# Patient Record
Sex: FEMALE | Race: WHITE | NOT HISPANIC OR LATINO | Employment: FULL TIME | URBAN - METROPOLITAN AREA
[De-identification: names, ages, dates, MRNs, and addresses within clinical notes are randomized per-mention and may not be internally consistent; named-entity substitution may affect disease eponyms.]

---

## 2021-03-10 ENCOUNTER — IMMUNIZATION (OUTPATIENT)
Dept: OBSTETRICS AND GYNECOLOGY | Facility: CLINIC | Age: 46
End: 2021-03-10

## 2021-03-10 DIAGNOSIS — Z23 NEED FOR VACCINATION: Primary | ICD-10-CM

## 2021-03-10 PROCEDURE — 0001A COVID-19, MRNA, LNP-S, PF, 30 MCG/0.3 ML DOSE VACCINE: ICD-10-PCS | Mod: CV19,,, | Performed by: FAMILY MEDICINE

## 2021-03-10 PROCEDURE — 0001A COVID-19, MRNA, LNP-S, PF, 30 MCG/0.3 ML DOSE VACCINE: CPT | Mod: CV19,,, | Performed by: FAMILY MEDICINE

## 2021-03-10 PROCEDURE — 91300 COVID-19, MRNA, LNP-S, PF, 30 MCG/0.3 ML DOSE VACCINE: CPT | Mod: ,,, | Performed by: FAMILY MEDICINE

## 2021-03-10 PROCEDURE — 91300 COVID-19, MRNA, LNP-S, PF, 30 MCG/0.3 ML DOSE VACCINE: ICD-10-PCS | Mod: ,,, | Performed by: FAMILY MEDICINE

## 2021-03-31 ENCOUNTER — IMMUNIZATION (OUTPATIENT)
Dept: OBSTETRICS AND GYNECOLOGY | Facility: CLINIC | Age: 46
End: 2021-03-31
Payer: COMMERCIAL

## 2021-03-31 DIAGNOSIS — Z23 NEED FOR VACCINATION: Primary | ICD-10-CM

## 2021-03-31 PROCEDURE — 0002A COVID-19, MRNA, LNP-S, PF, 30 MCG/0.3 ML DOSE VACCINE: CPT | Mod: PBBFAC | Performed by: FAMILY MEDICINE

## 2021-03-31 PROCEDURE — 91300 COVID-19, MRNA, LNP-S, PF, 30 MCG/0.3 ML DOSE VACCINE: CPT | Mod: PBBFAC | Performed by: FAMILY MEDICINE

## 2022-01-03 ENCOUNTER — OFFICE VISIT (OUTPATIENT)
Dept: OBSTETRICS AND GYNECOLOGY | Facility: CLINIC | Age: 47
End: 2022-01-03
Attending: STUDENT IN AN ORGANIZED HEALTH CARE EDUCATION/TRAINING PROGRAM
Payer: COMMERCIAL

## 2022-01-03 VITALS
DIASTOLIC BLOOD PRESSURE: 82 MMHG | HEIGHT: 61 IN | BODY MASS INDEX: 23.47 KG/M2 | SYSTOLIC BLOOD PRESSURE: 116 MMHG | WEIGHT: 124.31 LBS

## 2022-01-03 DIAGNOSIS — Z12.31 VISIT FOR SCREENING MAMMOGRAM: Primary | ICD-10-CM

## 2022-01-03 DIAGNOSIS — N64.4 BREAST PAIN: ICD-10-CM

## 2022-01-03 DIAGNOSIS — Z12.4 SCREENING FOR CERVICAL CANCER: ICD-10-CM

## 2022-01-03 DIAGNOSIS — Z01.419 WELL WOMAN EXAM: ICD-10-CM

## 2022-01-03 DIAGNOSIS — Z11.51 SCREENING FOR HPV (HUMAN PAPILLOMAVIRUS): ICD-10-CM

## 2022-01-03 PROCEDURE — 3074F SYST BP LT 130 MM HG: CPT | Mod: CPTII,S$GLB,, | Performed by: STUDENT IN AN ORGANIZED HEALTH CARE EDUCATION/TRAINING PROGRAM

## 2022-01-03 PROCEDURE — 87624 HPV HI-RISK TYP POOLED RSLT: CPT | Performed by: STUDENT IN AN ORGANIZED HEALTH CARE EDUCATION/TRAINING PROGRAM

## 2022-01-03 PROCEDURE — 99386 PREV VISIT NEW AGE 40-64: CPT | Mod: S$GLB,,, | Performed by: STUDENT IN AN ORGANIZED HEALTH CARE EDUCATION/TRAINING PROGRAM

## 2022-01-03 PROCEDURE — 1159F MED LIST DOCD IN RCRD: CPT | Mod: CPTII,S$GLB,, | Performed by: STUDENT IN AN ORGANIZED HEALTH CARE EDUCATION/TRAINING PROGRAM

## 2022-01-03 PROCEDURE — 3008F PR BODY MASS INDEX (BMI) DOCUMENTED: ICD-10-PCS | Mod: CPTII,S$GLB,, | Performed by: STUDENT IN AN ORGANIZED HEALTH CARE EDUCATION/TRAINING PROGRAM

## 2022-01-03 PROCEDURE — 99999 PR PBB SHADOW E&M-EST. PATIENT-LVL III: ICD-10-PCS | Mod: PBBFAC,,, | Performed by: STUDENT IN AN ORGANIZED HEALTH CARE EDUCATION/TRAINING PROGRAM

## 2022-01-03 PROCEDURE — 99999 PR PBB SHADOW E&M-EST. PATIENT-LVL III: CPT | Mod: PBBFAC,,, | Performed by: STUDENT IN AN ORGANIZED HEALTH CARE EDUCATION/TRAINING PROGRAM

## 2022-01-03 PROCEDURE — 99386 PR PREVENTIVE VISIT,NEW,40-64: ICD-10-PCS | Mod: S$GLB,,, | Performed by: STUDENT IN AN ORGANIZED HEALTH CARE EDUCATION/TRAINING PROGRAM

## 2022-01-03 PROCEDURE — 1159F PR MEDICATION LIST DOCUMENTED IN MEDICAL RECORD: ICD-10-PCS | Mod: CPTII,S$GLB,, | Performed by: STUDENT IN AN ORGANIZED HEALTH CARE EDUCATION/TRAINING PROGRAM

## 2022-01-03 PROCEDURE — 3008F BODY MASS INDEX DOCD: CPT | Mod: CPTII,S$GLB,, | Performed by: STUDENT IN AN ORGANIZED HEALTH CARE EDUCATION/TRAINING PROGRAM

## 2022-01-03 PROCEDURE — 3079F DIAST BP 80-89 MM HG: CPT | Mod: CPTII,S$GLB,, | Performed by: STUDENT IN AN ORGANIZED HEALTH CARE EDUCATION/TRAINING PROGRAM

## 2022-01-03 PROCEDURE — 88175 CYTOPATH C/V AUTO FLUID REDO: CPT | Performed by: STUDENT IN AN ORGANIZED HEALTH CARE EDUCATION/TRAINING PROGRAM

## 2022-01-03 PROCEDURE — 3074F PR MOST RECENT SYSTOLIC BLOOD PRESSURE < 130 MM HG: ICD-10-PCS | Mod: CPTII,S$GLB,, | Performed by: STUDENT IN AN ORGANIZED HEALTH CARE EDUCATION/TRAINING PROGRAM

## 2022-01-03 PROCEDURE — 3079F PR MOST RECENT DIASTOLIC BLOOD PRESSURE 80-89 MM HG: ICD-10-PCS | Mod: CPTII,S$GLB,, | Performed by: STUDENT IN AN ORGANIZED HEALTH CARE EDUCATION/TRAINING PROGRAM

## 2022-01-04 NOTE — PROGRESS NOTES
Chief Complaint: Well Woman Exam     HPI:      Nguyen Dimas is a 46 y.o.  who presents today for well woman exam.  LMP: No LMP recorded (lmp unknown). Patient has had a hysterectomy.  Supracervical hysterectomy.  Also has a history of small complex ovarian cyst in  that resolved.  Does report some breast pain.  No lumps or masses.  No issues, problems, or complaints. Specifically, patient denies abnormal vaginal bleeding, discharge, pelvic pain, urinary problems, or changes in appetite. Ms. Dimas is currently sexually active with a single male partner.     Previous Pap: Needs  Previous Mammogram: Needs  Most Recent Dexa: NA  Colonoscopy: NA    GYN Hx:  Menarche 16.  S/p hysterectomy in  with no cycles since then.  Possible abnormal pap smear years ago.     OB History        3    Para   3    Term   3            AB        Living   4       SAB        IAB        Ectopic        Multiple   1    Live Births   4               Past Medical History:   Diagnosis Date    Abnormal Pap smear of cervix      Past Surgical History:   Procedure Laterality Date    ABDOMINOPLASTY       SECTION      HYSTERECTOMY      INSERTION OF BREAST IMPLANT  2007    WISDOM TOOTH EXTRACTION       Social History     Socioeconomic History    Marital status:    Tobacco Use    Smoking status: Never Smoker    Smokeless tobacco: Never Used   Substance and Sexual Activity    Alcohol use: Yes     Comment: rarely    Drug use: Never    Sexual activity: Yes     Partners: Male     Birth control/protection: See Surgical Hx     Family History   Problem Relation Age of Onset    Breast cancer Neg Hx     Colon cancer Neg Hx     Ovarian cancer Neg Hx      No current outpatient medications on file.    ROS:     GENERAL: Denies unintentional weight gain or weight loss. Feeling well overall.   SKIN: Denies rash or lesions.   HEENT: Denies headaches, or vision changes.   CARDIOVASCULAR: Denies  "palpitations or chest pain.   RESPIRATORY: Denies shortness of breath or dyspnea on exertion.  BREASTS: Denies pain, lumps, or nipple discharge.   ABDOMEN: Denies abdominal pain, constipation, diarrhea, nausea, vomiting, change in appetite.  URINARY: Denies frequency, dysuria, hematuria.  NEUROLOGIC: Denies syncope or weakness.   PSYCHIATRIC: Denies depression, anxiety or mood swings.    Physical Exam:      PHYSICAL EXAM:  /82   Ht 5' 1" (1.549 m)   Wt 56.4 kg (124 lb 5.4 oz)   LMP  (LMP Unknown)   BMI 23.49 kg/m²   Body mass index is 23.49 kg/m².     APPEARANCE: Well nourished, well developed, in no acute distress.  PSYCH: Appropriate mood and affect.  SKIN: No acne or hirsutism.  NECK: Neck symmetric without masses or thyromegaly.  NODES: No inguinal, axillary, or supraclavicular lymph node enlargement.  BREASTS: Symmetrical, no skin changes or visible lesions.  No palpable masses or nipple discharge bilaterally.  ABDOMEN: Soft.  No tenderness or masses.    PELVIC: Normal external genitalia without lesions.  Normal hair distribution.  Adequate perineal body, normal urethral meatus.  Vagina moist and well rugated without lesions or discharge.  Cervix pink, without lesions, discharge or tenderness.  No significant cystocele or rectocele.  Bimanual exam shows uterus to be surgically absent.  Adnexa without masses or tenderness.    EXTREMITIES: No edema.  No tenderness to palpation.    Assessment/Plan:     46 y.o.     Visit for screening mammogram  -     Cancel: Mammo Digital Screening Bilat w/ Rick; Future; Expected date: 2022    Breast pain  -     Mammo Digital Diagnostic Bilat with Rick; Future; Expected date: 2022  -     US Breast Left Complete; Future; Expected date: 2022    Screening for HPV (human papillomavirus)  -     HPV High Risk Genotypes, PCR    Screening for cervical cancer  -     Liquid-Based Pap Smear, Screening    Well woman exam          Counselin.  Annual " exam performed today without difficulty.  Patient was counseled today on current ASCCP pap guidelines, the recommendation for yearly pelvic exams, healthy diet and exercise routines, annual mammograms.  DDx mammogram for breast pain.  Pap smear collected.  All questions answered.    2.  Follow up with PCP for other health maintenance.  3.  Follow up in about 3 months (around 4/3/2022).      Use of the [employer] Patient Portal discussed and encouraged during today's visit.

## 2022-01-07 LAB
FINAL PATHOLOGIC DIAGNOSIS: NORMAL
Lab: NORMAL

## 2022-01-14 LAB
HPV HR 12 DNA SPEC QL NAA+PROBE: NEGATIVE
HPV16 AG SPEC QL: NEGATIVE
HPV18 DNA SPEC QL NAA+PROBE: NEGATIVE

## 2022-01-18 ENCOUNTER — PATIENT MESSAGE (OUTPATIENT)
Dept: OBSTETRICS AND GYNECOLOGY | Facility: CLINIC | Age: 47
End: 2022-01-18
Payer: COMMERCIAL

## 2022-01-25 ENCOUNTER — HOSPITAL ENCOUNTER (OUTPATIENT)
Dept: RADIOLOGY | Facility: HOSPITAL | Age: 47
Discharge: HOME | End: 2022-01-25
Attending: STUDENT IN AN ORGANIZED HEALTH CARE EDUCATION/TRAINING PROGRAM
Payer: COMMERCIAL

## 2022-01-25 DIAGNOSIS — N64.4 BREAST PAIN: ICD-10-CM

## 2022-01-25 PROCEDURE — 76642 ULTRASOUND BREAST LIMITED: CPT | Mod: TC,LT

## 2022-01-25 PROCEDURE — 77062 MAMMO DIGITAL DIAGNOSTIC BILAT WITH TOMO: ICD-10-PCS | Mod: 26,,, | Performed by: RADIOLOGY

## 2022-01-25 PROCEDURE — 77066 DX MAMMO INCL CAD BI: CPT | Mod: 26,,, | Performed by: RADIOLOGY

## 2022-01-25 PROCEDURE — 77066 DX MAMMO INCL CAD BI: CPT | Mod: TC

## 2022-01-25 PROCEDURE — 77062 BREAST TOMOSYNTHESIS BI: CPT | Mod: 26,,, | Performed by: RADIOLOGY

## 2022-01-25 PROCEDURE — 76642 ULTRASOUND BREAST LIMITED: CPT | Mod: 26,LT,, | Performed by: RADIOLOGY

## 2022-01-25 PROCEDURE — 76642 US BREAST LEFT LIMITED: ICD-10-PCS | Mod: 26,LT,, | Performed by: RADIOLOGY

## 2022-01-25 PROCEDURE — 77066 MAMMO DIGITAL DIAGNOSTIC BILAT WITH TOMO: ICD-10-PCS | Mod: 26,,, | Performed by: RADIOLOGY

## 2022-01-26 ENCOUNTER — TELEPHONE (OUTPATIENT)
Dept: RADIOLOGY | Facility: HOSPITAL | Age: 47
End: 2022-01-26
Payer: COMMERCIAL

## 2022-01-26 NOTE — TELEPHONE ENCOUNTER
Spoke with patient. Reviewed breast biopsy procedure and reviewed instructions for breast biopsy. Patient expressed understanding and all questions were answered. Provided patient with my phone number to call for any further concerns or questions.   Patient scheduled breast biopsy at the [facility] on 2/8/2022.

## 2022-01-26 NOTE — TELEPHONE ENCOUNTER
Spoke with patient.  Breast biopsy cancelled after review of prior imagine.  All questions answered.  She will let us know if she has any questions or concerns.

## 2022-02-11 ENCOUNTER — PATIENT MESSAGE (OUTPATIENT)
Dept: OBSTETRICS AND GYNECOLOGY | Facility: CLINIC | Age: 47
End: 2022-02-11
Payer: COMMERCIAL

## 2022-02-11 DIAGNOSIS — R10.2 PELVIC PAIN: Primary | ICD-10-CM

## 2022-02-13 ENCOUNTER — PATIENT MESSAGE (OUTPATIENT)
Dept: OBSTETRICS AND GYNECOLOGY | Facility: CLINIC | Age: 47
End: 2022-02-13
Payer: COMMERCIAL

## 2022-03-17 ENCOUNTER — CLINICAL SUPPORT (OUTPATIENT)
Dept: REHABILITATION | Facility: HOSPITAL | Age: 47
End: 2022-03-17
Attending: STUDENT IN AN ORGANIZED HEALTH CARE EDUCATION/TRAINING PROGRAM
Payer: COMMERCIAL

## 2022-03-17 DIAGNOSIS — M62.838 MUSCLE SPASM: ICD-10-CM

## 2022-03-17 DIAGNOSIS — R10.30 LOWER ABDOMINAL PAIN: ICD-10-CM

## 2022-03-17 DIAGNOSIS — M79.10 MYALGIA: Primary | ICD-10-CM

## 2022-03-17 DIAGNOSIS — M54.6 THORACIC SPINE PAIN: ICD-10-CM

## 2022-03-17 DIAGNOSIS — R10.2 PELVIC PAIN: ICD-10-CM

## 2022-03-17 PROCEDURE — 97530 THERAPEUTIC ACTIVITIES: CPT | Mod: PO

## 2022-03-17 PROCEDURE — 97112 NEUROMUSCULAR REEDUCATION: CPT | Mod: PO

## 2022-03-17 PROCEDURE — 97161 PT EVAL LOW COMPLEX 20 MIN: CPT | Mod: PO

## 2022-03-17 NOTE — PLAN OF CARE
[facility]   Pelvic Health Physical Therapy Initial Evaluation      Date: 3/17/2022   Name: Nguyen Dimas  Clinic Number: [id number]    Therapy Diagnosis:   Encounter Diagnoses   Name Primary?    Pelvic pain     Myalgia Yes    Muscle spasm     Lower abdominal pain     Thoracic spine pain      Physician: Sasha Wood, *    Physician Orders: PT Eval and Treat - R10.2  Evaluation Date: 3/17/2022    Authorization Period Expiration: 2023  Plan of Care Expiration: 2022  Progress Note Due: 2022  Visit # / Visits authorized:    FOTO: Issued Visit #: -/-    Precautions: Standard    Time In: 1635  Time Out: 1735  Total Appointment Time (timed & untimed codes): 60 minutes    SUBJECTIVE     Date of onset: chronic    History of current condition - Nguyen reports: Having a recent flare following a coughing bout where back is back to feeling tension and locking up of back with some activity.  - feels better able to engage or get into a good position with workouts, but not throughout day- feels like positoning and everything is really uncomfortable   - abdominal wall feels better  - endo is mainly with back tenderness and slight left ovarian during month  - cough feels like pressure in neck and not so much in chest   - aggravated with fwd bending and lifting left leg (ex. toweling off after shower or drying hair), and with sneeze/cough and especially to left side; sitting >60'  - once back begins to relac and left is better the right is still bothersome  Nguyen goals: manage episodes of back pain independently     Surgical History: Nguyen Dimas  has a past surgical history that includes  section; Hysterectomy (); Abdominoplasty (); Insertion of breast implant (); Winside tooth extraction; and Augmentation of breast (Bilateral, ).    Red Flags: Pt denies and new or previous changes to bowel/bladder, saddle anesthesia, unexplained weight loss, and  unexplained changes to balance or gait.    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 I - Independnent  Current Level of Function:   Changing & Maintaining Body Position: Maintaining a Body Position: Remaining Seated, Remaining  Standing, Squatting  Mobility: Walking & Moving Around: prolonged periods  Carrying, Moving & Handling Objects: Community Integration/Access; Work/Vocation/Occupation  Medical History: Nguyen  has a past medical history of Abnormal Pap smear of cervix.   Imaging: see chart  Medications: Nguyen currently has no medications in their medication list.    Allergies: Review of patient's allergies indicates:  No Known Allergies     OBJECTIVE   See EMR under MEDIA for written consent provided 3/17/2022  Chaperone: declined    FUNCTIONAL MOVEMENT  SLS: ppt and knee flex  Squat: thoracic spine ext with OH  Bridges: hamstring cramping right>left with hip ir  Thoracic ROM: ext limited<limited; with arms oh and pvc difficulty with inhale to post. ribs; improved cough after cueing with pt reporting it felt better and more productive  Lumbar ROM: ext limited   Hip ROM: hip flexion tolerable with er, painful with ir/discomfort  General Postural Observation: self corrects posture with ppt; rib flare in rest; cueing for rib and pelvic alignment     OBSERVATION/PALPATION  Abdominal wall/Scarring: abdominal wall with increased fascial mobility, umbilicus with  less discomfort with palpation and less restrictions in mobility noted throughout abdomen; numbness remains to suprapubic region of scar   Rib cage/Breath: Decreased excursion bilaterally of lateral ribs , Decreased excursion of the sternum  and difficulty with post rib enggement in all positions and reproruction of cramp on l lat with thoracic flexion and inhale;      TREATMENT   Total Treatment time (time-based codes) separate from Evaluation: 38 minutes     2 units of TherAct: 25 minutes  improve pfm during functional mobility and adls, to improve body mechanics during toileting, and to simulate physical requirement of exercise, work, and parenting    [x]   psoas, coccygeus release   []   iap mgmt + body mechanics with adls; bowel +bladder habits/mechanics   []   nutrition and digestion on pfm and bowel function     1 units of Neuro Re-ed.: 15 minutes  sensory re-education to increase optimal response of tissues to sensory stimuli encountered in daily task completion, improve quality of muscle recruitment, improve motor control and sequencing, and increase coordination   [x]   Coordination, Control, Posture, and Proprioception    [x]   rib breath +v/t cueing and facilitation for rib mobiltiy    [x]   postural awareness   []   pfm awareness   [x]   core engagement +facilitation and v/t cueing to maintain rib alignment   [x]   fascial release to thoracic spine in OH supine squat positioning with pvc  and cueing/facilitation     - units of TherEx: - minutes strength, endurance, and ROM    []      []   bridges   []   squat     Neuromuscular Re-education to improve Coordination, Posture and Proprioception for 15 minutes including: umbilical and abdominal wall fascial mob; at rest and with functional mobility for sensory re-education to increase optimal response of tissues to sensory stimuli encountered in daily task completion and increase coordination    Therapeutic Activity to improve dynamic functional performance  for 25 minutes including:  body mechanics and iap mgmt; anatomy and physiology of pfm and function with toileting and mobility; psoas + coccygeus release bilaterally; bowel and bladder mechanics to improve pfm during functional mobility and adls    PATIENT EDUCATION AND HOME EXERCISES     Education provided:   general anatomy/physiology of urinary/ bowel  system, benefits of treatment and risks of treatment were discussed with the patient. Additionally, anatomy/physiology of pelvic floor, posture/body mechanices, behavior modifications and abdominal and thoracic spine interaction were reviewed.     Written Home Exercises provided: yes.  Exercises were reviewed and Nguyen was able to demonstrate them prior to the end of the session. Nguyen demonstrated good  understanding of the education provided. See EMR under Patient Instructions for exercises provided during therapy sessions.    ASSESSMENT     Nguyen is a 46 y.o. female referred to outpatient Physical Therapy with a medical diagnosis of pelvic pain, as well as complaints of low to mid back and abdominal pain limiting ability to participate in exercise/health/wellness and work activities independently and without reproduction of sx. Pt presents with decreased range of motion, coordination, and proprioception of bilateral lower extremities and b hips; decreased arom of thoracic and lumbar  spines and ribs contributing to decreased motor control and coordination of core stability and iap mgmt, contributing to persistent and recurrent nature of sx.  Patient prognosis is Fair.   Patient will benefit from skilled outpatient Physical Therapy to address the deficits stated above and in the chart below, provide patient/family education, and to maximize patient's level of independence.     Plan of care discussed with patient: Yes  Patient's spiritual, cultural and educational needs considered and patient is agreeable to the plan of care and goals as stated below:      Anticipated Barriers for therapy: none    Medical Necessity is demonstrated by the following:  History  Co-morbidities and personal factors that may impact the plan of care Co-morbidities   endometriosis, prior abdominal surgery and prior pelvic surgery    Personal Factors  no deficits     low   Examination  Body structures and functions, activity limitations and participation restrictions that may impact the plan of care Body Regions/Systems/Functions:  altered posture, pelvic asymmetry, poor knowledge of body mechanics and posture, adhered abdominal scar, poor trunk stability and unable to co-contract or co-relax abdominal wall and pelvic floor muscles     Participation Restrictions:  social activities with friends/family, ADL participation affected by pain, work duties and exercise restrictions due to pain     Activity limitations:   Learning and applying knowledge  no deficits    General Tasks and Commands  no deficits    Communication  no deficits    Mobility  lifting and carrying objects    Self care  no deficits    Domestic Life  no deficits    Interactions/Relationships  no deficits    Life Areas  no deficits    Community and Social Life  no deficits       low   Clinical Presentation stable and uncomplicated low   Decision Making/ Complexity Score: low     Goals:  Short Term Goals: 6 weeks   Pt will be able to report 50% increase in productivity of coughing.  Pt to perform co-contraction of pelvic floor muscles and core with lifting and during weighted exercise.  Pt to demonstrate proper diaphragmatic breathing to help with calming the nervous system in order to decrease pain and to improve abdominal wall musculature extensibility.   Pt to report being able to complete a half day at work without significant increase in pain to demonstrate a return towards prior level of function.  Pt to report no pain with palpation of abdominal wall due to improvement in soft tissue mobility from moderate to minimal  restrictions.   Pt to be able to shower and dress without reproduction of sx at least 50% of the time.    Long Term Goals: 16 weeks   Pt will be able to sit at least 75' or during a movie without reproduction of sx.  Pt will be able to report 50% increase in productivity of coughing.  Pt to report being able to complete a full day at work without significant increase in pain to demonstrate a return towards prior level of function.  Pt to report no pain with palpation of abdominal wall due to improvement in soft tissue mobility from moderate to minimal restrictions.   Pt to report feeling confident in ability to assume appropriate mechanics during work 80% of the time.  Pt to be able to shower and dress without reproduction of sx at least 85% of time.    PLAN     Plan of care Certification: 3/17/2022 to 7/7/2022.    Outpatient Physical Therapy 1 time(s) every 2-4 week(s) for 16 weeks to include the following interventions: Gait Training, Manual Therapy, Neuromuscular Re-ed, Patient Education, Therapeutic Activities, Therapeutic Exercise, and dry needling.     Next Visit: recheck rib mobility and fwd bend +hip flexion    Gissell Garza, PT      I CERTIFY THE NEED FOR THESE SERVICES FURNISHED UNDER THIS PLAN OF TREATMENT AND WHILE UNDER MY CARE   Physician's comments:     Physician's Signature: ___________________________________________________

## 2022-04-15 PROBLEM — M54.6 THORACIC SPINE PAIN: Status: ACTIVE | Noted: 2022-04-15

## 2022-04-15 PROBLEM — R10.2 PELVIC PAIN: Status: ACTIVE | Noted: 2022-04-15

## 2022-04-15 PROBLEM — M79.10 MYALGIA: Status: ACTIVE | Noted: 2022-04-15

## 2022-04-15 PROBLEM — R10.30 LOWER ABDOMINAL PAIN: Status: ACTIVE | Noted: 2022-04-15

## 2022-04-15 PROBLEM — M62.838 MUSCLE SPASM: Status: ACTIVE | Noted: 2022-04-15

## 2022-04-25 ENCOUNTER — PATIENT MESSAGE (OUTPATIENT)
Dept: REHABILITATION | Facility: HOSPITAL | Age: 47
End: 2022-04-25
Payer: COMMERCIAL

## 2022-05-05 ENCOUNTER — CLINICAL SUPPORT (OUTPATIENT)
Dept: REHABILITATION | Facility: HOSPITAL | Age: 47
End: 2022-05-05
Payer: COMMERCIAL

## 2022-05-05 DIAGNOSIS — M54.6 THORACIC SPINE PAIN: ICD-10-CM

## 2022-05-05 DIAGNOSIS — R10.30 LOWER ABDOMINAL PAIN: ICD-10-CM

## 2022-05-05 DIAGNOSIS — M79.10 MYALGIA: ICD-10-CM

## 2022-05-05 DIAGNOSIS — R10.2 PELVIC AND PERINEAL PAIN: Primary | ICD-10-CM

## 2022-05-05 PROCEDURE — 97112 NEUROMUSCULAR REEDUCATION: CPT | Mod: PO

## 2022-05-05 PROCEDURE — 97530 THERAPEUTIC ACTIVITIES: CPT | Mod: PO

## 2022-05-05 NOTE — PROGRESS NOTES
Pelvic Health Physical Therapy   Progress Note     Name: Nguyen Dimas; [id number]  Visit Date: 5/5/2022  Visit 2 of 24 Auth. Expiration: 12/31/2022 POC Exp.: 7/7/2022   Eval Date: 3/17/2022 Next PN: 6/2/2022 1 Cancels    FOTO: Issued Visit #: -/- Last PN: - - No Shows    Physician: Sasha Wood, *  Physician Orders: PT Eval and Treat - R10.2  Therapy Diagnosis:   Encounter Diagnoses   Name Primary?    Pelvic and perineal pain Yes    Myalgia     Lower abdominal pain     Thoracic spine pain      Precautions: Standard    Time In/Out: 1640/1740  Total Billable Time: 60 minutes    Subjective     Goals:  Short Term Goals: 6 weeks   met   Pt will be able to report 50% increase in productivity of coughing.  met   Pt to perform co-contraction of pelvic floor muscles and core with lifting and during weighted exercise.  met   Pt to demonstrate proper diaphragmatic breathing to help with calming the nervous system in order to decrease pain and to improve abdominal wall musculature extensibility.   met   Pt to report being able to complete a half day at work without significant increase in pain to demonstrate a return towards prior level of function.  met   Pt to report no pain with palpation of abdominal wall due to improvement in soft tissue mobility from moderate to minimal restrictions.   met   Pt to be able to shower and dress without reproduction of sx at least 50% of the time.     Long Term Goals: 16 weeks     Pt will be able to sit at least 75' or during a movie without reproduction of sx.    Pt will be able to report 50% increase in productivity of coughing.    Pt to report being able to complete a full day at work without significant increase in pain to demonstrate a return towards prior level of function.    Pt to report no pain with palpation of abdominal wall due to improvement in soft tissue mobility from moderate to minimal restrictions.     Pt to report feeling confident in ability to  assume appropriate mechanics during work 80% of the time.    Pt to be able to shower and dress without reproduction of sx at least 85% of time.    Pt reports: having some mild tenderness. was sick and did notice was able to cough with less discomfort at back. hinging throughout day when bending. doeeas have stiffnes in low back with waking and has been putting on shorts seated. sometimes pain with fwd bending.   does note some numbness at approx. L1 and relates some perisitent oain/discomfort since epidural.   She was compliant with home exercise program, working on posture and positioning wit hactivities at home and work   Functional change/Response to previous treatment: decreased discomfort at back; better practice with body mechanics     Today's Treatment: 55 minutes    Pt informed of risks, benefits, and alternatives and verbally consents to today's treatment. Signed consent on file.     t/l junction with hinge joint in range of motion; left/s flat with flexion; sacral distr    2 units of TherAct: 25 minutes  improve pfm during functional mobility and adls, to improve body mechanics during toileting, and to simulate physical requirement of exercise, work    [x]  lumbar spine flex range of motion- flat lumbar portion, t/l hinge with ext v/t cueing for complete thoracic spine and lumbar rom   [x]  iap mgmt and body mechcanics modification with increased lumbar spine flexion without load and mod. home hep (no lumbar spine flex str, limit romt for butterfly stretch), mod supine fugure 4   [x]  sacral distraction with decreased pain to palpation at sacral spine on left      2 units of Neuro Re-ed.: 30 minutes  sensory re-education to increase optimal response of tissues to sensory stimuli encountered in daily task completion, improve quality of muscle recruitment, improve motor control and sequencing, and increase coordination   [x]  Coordination, Control, Posture, and Proprioception    [x]  rib breath post rib  cage limited-mid thoracic spine flex roll attempted with some discomfort, able to tolerate 3 reps and with improved rib mobility following   []  postural awareness hr +mini ppt lift with arch pain at distance from glutes   []  pfm awareness   [x]  core engagement   [x]  fascial release +compression, percussive compression to kalyan/ paraspinals with orange peel texturing to skin and blood flow response; sacral     - units of TherEx: - minutes strength, endurance, and ROM    []     []  bridges   []  squat     Education provided:   - posture/body mechanices, isometric abdominal exercises and behavior modifications  Discussed progression of plan of care with patient; educated pt in activity modification; reviewed HEP with pt. Pt demonstrated and verbalized understanding of all instruction and was provided with a handout of HEP (see Patient Instructions).    Home Exercises Provided: Patient instructed to cont prior HEP.  Exercises were reviewed and Nguyen was able to demonstrate them prior to the end of the session.  Nguyen demonstrated good  understanding of the education provided.   See EMR under Patient Instructions for exercises provided 5/5/2022.    Assessment     Pt required cueing: verbal and tactile cueing for appropriate performance and sequencing of core/abdominal engagement with activity. After fascial and cns breath tx pt able to demonstrate improved range of motion and awareness of region. Future treatment will focus on further coordination and proprioception of postural and pfm awareness in all positions to promote increased confidence and safety, as well as, to improve endurance, coordination, and stability of core and pfm that will carryover to different tasks and adls.  Pt prognosis is Good.   Pt will continue to benefit from skilled outpatient physical therapy to address the deficits listed in the problem list box on initial evaluation, provide pt/family education and to maximize pt's level of independence  in the home and community environment.   Pt's spiritual, cultural and educational needs considered and pt agreeable to plan of care and goals.     Anticipated barriers to physical therapy: none    Plan   Next Visit: thoracic spine range of motion and coordination with activity OH, squat and lunge; half kneel balance    Gissell Garza, PT, DPT

## 2022-05-12 ENCOUNTER — CLINICAL SUPPORT (OUTPATIENT)
Dept: REHABILITATION | Facility: HOSPITAL | Age: 47
End: 2022-05-12
Payer: COMMERCIAL

## 2022-05-12 DIAGNOSIS — R10.2 PELVIC AND PERINEAL PAIN: ICD-10-CM

## 2022-05-12 DIAGNOSIS — M79.10 MYALGIA: ICD-10-CM

## 2022-05-12 DIAGNOSIS — R10.30 LOWER ABDOMINAL PAIN: ICD-10-CM

## 2022-05-12 DIAGNOSIS — M62.838 MUSCLE SPASM: ICD-10-CM

## 2022-05-12 DIAGNOSIS — M54.6 THORACIC SPINE PAIN: Primary | ICD-10-CM

## 2022-05-12 DIAGNOSIS — R10.2 PELVIC PAIN: ICD-10-CM

## 2022-05-12 PROCEDURE — 97530 THERAPEUTIC ACTIVITIES: CPT | Mod: PO

## 2022-05-12 PROCEDURE — 97112 NEUROMUSCULAR REEDUCATION: CPT | Mod: PO

## 2022-05-12 NOTE — PLAN OF CARE
Pelvic Health Physical Therapy   Progress Note     Name: Nguyen Dimas; [id number]  Visit Date: 5/5/2022  Visit 2 of 24 Auth. Expiration: 12/31/2022 POC Exp.: 7/7/2022   Eval Date: 3/17/2022 Next PN: 6/2/2022 1 Cancels    FOTO: Issued Visit #: -/- Last PN: - - No Shows    Physician: Sasha Wood, *  Physician Orders: PT Eval and Treat - R10.2  Therapy Diagnosis:   Encounter Diagnoses   Name Primary?    Pelvic and perineal pain Yes    Myalgia     Lower abdominal pain     Thoracic spine pain      Precautions: Standard    Time In/Out: 1640/1740  Total Billable Time: 60 minutes    Subjective     Goals:  Short Term Goals: 6 weeks   met   Pt will be able to report 50% increase in productivity of coughing.  met   Pt to perform co-contraction of pelvic floor muscles and core with lifting and during weighted exercise.  met   Pt to demonstrate proper diaphragmatic breathing to help with calming the nervous system in order to decrease pain and to improve abdominal wall musculature extensibility.   met   Pt to report being able to complete a half day at work without significant increase in pain to demonstrate a return towards prior level of function.  met   Pt to report no pain with palpation of abdominal wall due to improvement in soft tissue mobility from moderate to minimal restrictions.   met   Pt to be able to shower and dress without reproduction of sx at least 50% of the time.     Long Term Goals: 16 weeks     Pt will be able to sit at least 75' or during a movie without reproduction of sx.    Pt will be able to report 50% increase in productivity of coughing.    Pt to report being able to complete a full day at work without significant increase in pain to demonstrate a return towards prior level of function.    Pt to report no pain with palpation of abdominal wall due to improvement in soft tissue mobility from moderate to minimal restrictions.     Pt to report feeling confident in ability to  assume appropriate mechanics during work 80% of the time.    Pt to be able to shower and dress without reproduction of sx at least 85% of time.    Pt reports: having some mild tenderness. was sick and did notice was able to cough with less discomfort at back. hinging throughout day when bending. doeeas have stiffnes in low back with waking and has been putting on shorts seated. sometimes pain with fwd bending.   does note some numbness at approx. L1 and relates some perisitent oain/discomfort since epidural.   She was compliant with home exercise program, working on posture and positioning wit hactivities at home and work   Functional change/Response to previous treatment: decreased discomfort at back; better practice with body mechanics     Today's Treatment: 55 minutes    Pt informed of risks, benefits, and alternatives and verbally consents to today's treatment. Signed consent on file.     t/l junction with hinge joint in range of motion; left/s flat with flexion; sacral distr    2 units of TherAct: 25 minutes  improve pfm during functional mobility and adls, to improve body mechanics during toileting, and to simulate physical requirement of exercise, work    [x]  lumbar spine flex range of motion- flat lumbar portion, t/l hinge with ext v/t cueing for complete thoracic spine and lumbar rom   [x]  iap mgmt and body mechcanics modification with increased lumbar spine flexion without load and mod. home hep (no lumbar spine flex str, limit romt for butterfly stretch), mod supine fugure 4   [x]  sacral distraction with decreased pain to palpation at sacral spine on left      2 units of Neuro Re-ed.: 30 minutes  sensory re-education to increase optimal response of tissues to sensory stimuli encountered in daily task completion, improve quality of muscle recruitment, improve motor control and sequencing, and increase coordination   [x]  Coordination, Control, Posture, and Proprioception    [x]  rib breath post rib  cage limited-mid thoracic spine flex roll attempted with some discomfort, able to tolerate 3 reps and with improved rib mobility following   []  postural awareness hr +mini ppt lift with arch pain at distance from glutes   []  pfm awareness   [x]  core engagement   [x]  fascial release +compression, percussive compression to kalyan/ paraspinals with orange peel texturing to skin and blood flow response; sacral     - units of TherEx: - minutes strength, endurance, and ROM    []     []  bridges   []  squat     Education provided:   - posture/body mechanices, isometric abdominal exercises and behavior modifications  Discussed progression of plan of care with patient; educated pt in activity modification; reviewed HEP with pt. Pt demonstrated and verbalized understanding of all instruction and was provided with a handout of HEP (see Patient Instructions).    Home Exercises Provided: Patient instructed to cont prior HEP.  Exercises were reviewed and Nguyen was able to demonstrate them prior to the end of the session.  Nguyen demonstrated good  understanding of the education provided.   See EMR under Patient Instructions for exercises provided 5/5/2022.    Assessment     Pt required cueing: verbal and tactile cueing for appropriate performance and sequencing of core/abdominal engagement with activity. After fascial and cns breath tx pt able to demonstrate improved range of motion and awareness of region. Future treatment will focus on further coordination and proprioception of postural and pfm awareness in all positions to promote increased confidence and safety, as well as, to improve endurance, coordination, and stability of core and pfm that will carryover to different tasks and adls.  Pt prognosis is Good.   Pt will continue to benefit from skilled outpatient physical therapy to address the deficits listed in the problem list box on initial evaluation, provide pt/family education and to maximize pt's level of independence  in the home and community environment.   Pt's spiritual, cultural and educational needs considered and pt agreeable to plan of care and goals.     Anticipated barriers to physical therapy: none    Plan   Next Visit: thoracic spine range of motion and coordination with activity OH, squat and lunge; half kneel balance    Plan of Care: Continue established POC.  Gissell Garza, PT, DPT

## 2022-05-12 NOTE — PROGRESS NOTES
Pelvic Health Physical Therapy   Progress Note     Name: Nguyen Dimas; [id number]  Visit Date: 5/12/2022  Visit 3 of 24 Auth. Expiration: 12/31/2022 POC Exp.: 7/7/2022   Eval Date: 3/17/2022 Next PN: 6/2/2022 1 Cancels    FOTO: Issued Visit #: -/- Last PN: - - No Shows    Physician: Sasha Wood, *  Physician Orders: PT Eval and Treat - R10.2  Therapy Diagnosis:   Encounter Diagnoses   Name Primary?    Thoracic spine pain Yes    Pelvic and perineal pain      Precautions: Standard    Time In/Out: 1640/1740  Total Billable Time: 60 minutes    Subjective     Goals:  Short Term Goals: 6 weeks   met   Pt will be able to report 50% increase in productivity of coughing.  met   Pt to perform co-contraction of pelvic floor muscles and core with lifting and during weighted exercise.  met   Pt to demonstrate proper diaphragmatic breathing to help with calming the nervous system in order to decrease pain and to improve abdominal wall musculature extensibility.   met   Pt to report being able to complete a half day at work without significant increase in pain to demonstrate a return towards prior level of function.  met   Pt to report no pain with palpation of abdominal wall due to improvement in soft tissue mobility from moderate to minimal restrictions.   met   Pt to be able to shower and dress without reproduction of sx at least 50% of the time.     Long Term Goals: 16 weeks     Pt will be able to sit at least 75' or during a movie without reproduction of sx.    Pt will be able to report 50% increase in productivity of coughing.    Pt to report being able to complete a full day at work without significant increase in pain to demonstrate a return towards prior level of function.    Pt to report no pain with palpation of abdominal wall due to improvement in soft tissue mobility from moderate to minimal restrictions.     Pt to report feeling confident in ability to assume appropriate mechanics during  work 80% of the time.    Pt to be able to shower and dress without reproduction of sx at least 85% of time.    Pt reports: some pain with kneeling in [employer]; also notices some increased tension around this time in cycle and feels best with decompression, wondering how to best do that at home safely.   She was compliant with home exercise program, working on posture and positioning wit hactivities at home and work   Functional change/Response to previous treatment: decreased discomfort at back; better practice with body mechanics     Today's Treatment: 55 minutes    Pt informed of risks, benefits, and alternatives and verbally consents to today's treatment. Signed consent on file.   ??? thoracic spine range of motion and coordination with activity OH, squat and lunge; half kneel balance \     t/l junction with hinge joint in range of motion; left/s flat with flexion; sacral distr    2 units of TherAct: 25 minutes  improve pfm during functional mobility and adls, to improve body mechanics during toileting, and to simulate physical requirement of exercise, work    [x]  tibial ir/er limited left>right; slider arom and foot intrinsic   [x]  decompression for menstrual pain at lumbar/sacrtal junction   [x]  sacral distraction with decreased pain to palpation at sacral spine on left      2 units of Neuro Re-ed.: 30 minutes  sensory re-education to increase optimal response of tissues to sensory stimuli encountered in daily task completion, improve quality of muscle recruitment, improve motor control and sequencing, and increase coordination   [x]  Coordination, Control, Posture, and Proprioception    [x]  rib breath post rib cage limited-mid thoracic spine flex roll attempted with some discomfort, able to tolerate 3 reps and with improved rib mobility following   []  postural awareness - tall kneel and half kneel balance - imbalance right>left; able to maintain alignment , but did have some increased quad pain in right  stance limb following leg lift in half kneel    []  pfm awareness   [x]  core engagement   [x]  fascial release +compression to bilateral lower limb and right patella with increased perfusion and decreased fascial restriction     - units of TherEx: - minutes strength, endurance, and ROM    []     []  bridges   []  squat     Education provided:   - posture/body mechanices, isometric abdominal exercises and behavior modifications  Discussed progression of plan of care with patient; educated pt in activity modification; reviewed HEP with pt. Pt demonstrated and verbalized understanding of all instruction and was provided with a handout of HEP (see Patient Instructions).    Home Exercises Provided: Patient instructed to cont prior HEP.  Exercises were reviewed and Nguyen was able to demonstrate them prior to the end of the session.  Nguyen demonstrated good  understanding of the education provided.   See EMR under Patient Instructions for exercises provided 5/5/2022.    Assessment     Pt required cueing: verbal and tactile cueing for appropriate performance and sequencing of core/abdominal engagement with activity. Able to maintain alignment throughout tall and half kneel until challenged by sls in half kneel with lob bilaterally. Able to demonstrate 3 methods of self decompression to decrease menstrual sx. at home.  Future treatment will focus on further coordination and proprioception of postural and pfm awareness in all positions to promote increased confidence and safety, as well as, to improve endurance, coordination, and stability of core and pfm that will carryover to different tasks and adls.  Pt prognosis is Good.   Pt will continue to benefit from skilled outpatient physical therapy to address the deficits listed in the problem list box on initial evaluation, provide pt/family education and to maximize pt's level of independence in the home and community environment.   Pt's spiritual, cultural and educational  needs considered and pt agreeable to plan of care and goals.     Anticipated barriers to physical therapy: none    Plan   Next Visit: decompression recheck; thoracic spine range of motion and coordination with activity OH, squat and lunge; half kneel balance    Gissell Garza, PT, DPT

## 2022-05-19 ENCOUNTER — CLINICAL SUPPORT (OUTPATIENT)
Dept: REHABILITATION | Facility: HOSPITAL | Age: 47
End: 2022-05-19
Payer: COMMERCIAL

## 2022-05-19 DIAGNOSIS — R10.30 LOWER ABDOMINAL PAIN: ICD-10-CM

## 2022-05-19 DIAGNOSIS — M54.6 THORACIC SPINE PAIN: ICD-10-CM

## 2022-05-19 DIAGNOSIS — R10.2 PELVIC PAIN: Primary | ICD-10-CM

## 2022-05-19 DIAGNOSIS — M62.838 MUSCLE SPASM: ICD-10-CM

## 2022-05-19 DIAGNOSIS — M79.10 MYALGIA: ICD-10-CM

## 2022-05-19 PROCEDURE — 97112 NEUROMUSCULAR REEDUCATION: CPT | Mod: PO

## 2022-05-19 PROCEDURE — 97530 THERAPEUTIC ACTIVITIES: CPT | Mod: PO

## 2022-05-19 NOTE — PROGRESS NOTES
Pelvic Health Physical Therapy   Progress Note     Name: Nguyen Dimas; [id number]  Visit Date: 5/19/2022  Visit 3 of 24 Auth. Expiration: 12/31/2022 POC Exp.: 7/7/2022   Eval Date: 3/17/2022 Next PN: 6/2/2022 1 Cancels    FOTO: Issued Visit #: -/- Last PN: - - No Shows    Physician: Sasha Wood, *  Physician Orders: PT Eval and Treat - R10.2  Therapy Diagnosis:   Encounter Diagnoses   Name Primary?    Pelvic pain Yes    Thoracic spine pain     Muscle spasm     Myalgia     Lower abdominal pain      Precautions: Standard    Time In/Out: 1630/1730  Total Billable Time: 60 minutes    Subjective     Goals:  Short Term Goals: 6 weeks   met   Pt will be able to report 50% increase in productivity of coughing.  met   Pt to perform co-contraction of pelvic floor muscles and core with lifting and during weighted exercise.  met   Pt to demonstrate proper diaphragmatic breathing to help with calming the nervous system in order to decrease pain and to improve abdominal wall musculature extensibility.   met   Pt to report being able to complete a half day at work without significant increase in pain to demonstrate a return towards prior level of function.  met   Pt to report no pain with palpation of abdominal wall due to improvement in soft tissue mobility from moderate to minimal restrictions.   met   Pt to be able to shower and dress without reproduction of sx at least 50% of the time.     Long Term Goals: 16 weeks     Pt will be able to sit at least 75' or during a movie without reproduction of sx.    Pt will be able to report 50% increase in productivity of coughing.    Pt to report being able to complete a full day at work without significant increase in pain to demonstrate a return towards prior level of function.    Pt to report no pain with palpation of abdominal wall due to improvement in soft tissue mobility from moderate to minimal restrictions.     Pt to report feeling confident in  ability to assume appropriate mechanics during work 80% of the time.    Pt to be able to shower and dress without reproduction of sx at least 85% of time.    Pt reports: Knee felt great after last visit- no back pain or knee pain with high heels over graduation and various ceremonies. Did have some back discomfort though following. Also would like to review some bridge exercises and wonders if those are okay to do.  She was compliant with home exercise program, working on posture and positioning wit hactivities at home and work   Functional change/Response to previous treatment: decreased discomfort at back; better practice with body mechanics     Today's Treatment: 55 minutes    Pt informed of risks, benefits, and alternatives and verbally consents to today's treatment. Signed consent on file.    2 units of TherAct: 25 minutes  improve pfm during functional mobility and adls, to improve body mechanics during toileting, and to simulate physical requirement of exercise, work    [x]  thoracic spine compensation for ppt/apt   [x] bridge with review   [x]  self fascial release demo with scoop and glide to thigh     2 units of Neuro Re-ed.: 30 minutes  sensory re-education to increase optimal response of tissues to sensory stimuli encountered in daily task completion, improve quality of muscle recruitment, improve motor control and sequencing, and increase coordination   [x]  Coordination, Control, Posture, and Proprioception - some difficulty feeling hs engagement on right compared to left, with some fascial sensation at right adductor mag., difficulty with apt/ppt in sls with associated range of mot (sup/pron), but able with relaxed fwd bend;    []  rib breath post rib cage limited   [x]  postural awareness -supine pelvic mob with hip flex- hs focus isometric with range of motion at wall, with chair squat and ppt/apt; supine with strap (given for hep); pelvic nad lumbar spine dissociation challenging    []  pfm  awareness   [x]  core engagement with v.t cueing    []      - units of TherEx: - minutes strength, endurance, and ROM    []     []  bridges   []  squat     Education provided:   - posture/body mechanices, isometric abdominal exercises and behavior modifications  Discussed progression of plan of care with patient; educated pt in activity modification; reviewed HEP with pt. Pt demonstrated and verbalized understanding of all instruction and was provided with a handout of HEP (see Patient Instructions).    Home Exercises Provided: Patient instructed to cont prior HEP.  Exercises were reviewed and Nguyen was able to demonstrate them prior to the end of the session.  Nguyen demonstrated good  understanding of the education provided.   See EMR under Patient Instructions for exercises provided 5/19/2022.    Assessment     Pt required cueing: verbal and tactile cueing for appropriate performance and sequencing of core/abdominal and hamstring engagement with activity.  motion of lumbar spine and pelvis from femur challenging right >left.  Future treatment will focus on further coordination and proprioception of postural and pfm awareness in all positions to promote increased confidence and safety, as well as, to improve endurance, coordination, and stability of core and pfm that will carryover to different tasks and adls.  Pt prognosis is Good.   Pt will continue to benefit from skilled outpatient physical therapy to address the deficits listed in the problem list box on initial evaluation, provide pt/family education and to maximize pt's level of independence in the home and community environment.   Pt's spiritual, cultural and educational needs considered and pt agreeable to plan of care and goals.     Anticipated barriers to physical therapy: none    Plan   Next Visit: pelvis range of motion and femur wband nwb; hamstring and adductor length and motor control    Gissell Garza, PT, DPT

## 2022-07-07 ENCOUNTER — CLINICAL SUPPORT (OUTPATIENT)
Dept: REHABILITATION | Facility: HOSPITAL | Age: 47
End: 2022-07-07
Payer: COMMERCIAL

## 2022-07-07 DIAGNOSIS — M62.838 MUSCLE SPASM: ICD-10-CM

## 2022-07-07 DIAGNOSIS — R10.30 LOWER ABDOMINAL PAIN: ICD-10-CM

## 2022-07-07 DIAGNOSIS — M79.10 MYALGIA: ICD-10-CM

## 2022-07-07 DIAGNOSIS — M54.6 THORACIC SPINE PAIN: ICD-10-CM

## 2022-07-07 DIAGNOSIS — R10.2 PELVIC PAIN: Primary | ICD-10-CM

## 2022-07-07 PROCEDURE — 97530 THERAPEUTIC ACTIVITIES: CPT | Mod: PO

## 2022-07-07 PROCEDURE — 97112 NEUROMUSCULAR REEDUCATION: CPT | Mod: PO

## 2022-07-07 NOTE — PROGRESS NOTES
Pelvic Health Physical Therapy   Progress Note     Name: Nguyen Dimas; [id number]  Visit Date: 7/7/2022  Visit 3 of 24 Auth. Expiration: 12/31/2022 POC Exp.: 7/7/2022   Eval Date: 3/17/2022 Next PN: 6/2/2022 1 Cancels    FOTO: Issued Visit #: -/- Last PN: - - No Shows    Physician: Sasha Wood, *  Physician Orders: PT Eval and Treat R10.2  Therapy Diagnosis:   Encounter Diagnoses   Name Primary?    Pelvic pain Yes    Thoracic spine pain     Muscle spasm     Myalgia     Lower abdominal pain      Precautions: Standard    Time In/Out: 1330/1430  Total Billable Time: 60 minutes    SUBJECTIVE   Long Term Goals: 16 weeks   met   Pt will be able to sit at least 75' or during a movie without reproduction of sx.  met   Pt will be able to report 50% increase in productivity of coughing.  75%  Pt to report being able to complete a full day at work without significant increase in pain to demonstrate a return towards prior level of function.  50%  Pt to report no pain with palpation of abdominal wall due to improvement in soft tissue mobility from moderate to minimal restrictions.     Pt to report feeling confident in ability to assume appropriate mechanics during work 80% of the time.    Pt to be able to shower and dress without reproduction of sx at least 85% of time.    Pt reports: Has been doing well; did have a period with some increased pain but was able to manage it; also able to play volleyball 3-4x/2 days and aside from some increased back pain after a dive to ground was okay once she stopped playing.   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 Signed consent on file.     2 units of TherAct: 24 minutes  improve pfm during functional mobility and adls, to improve body mechanics during toileting, and to simulate physical requirement of exercise, work   [x]   modifications to body mechanics, sitting postures, and positioning for work   [x]   iap mgmt exhale some and breath hold as able with resistance exercsies   [x] hep mods for pelvis and hip dissociation     2 units of Neuro Re-ed.: 33 minutes  sensory re-education to increase optimal response of tissues to sensory stimuli encountered in daily task completion, improve quality of muscle recruitment, improve motor control and sequencing, and increase coordination   [x]   Coordination, Control, Posture, and Proprioception with hip hinge and airplane for pelvis and abdominals; +feebdack with ball for knee and hip; DL mod with band and v/t cueing for shins and lats   [x]   single arm row with 10#   v/t cueing for lat engagement and cervical mm relaxation   [x]  postural/core awareness - lower abdomen recruitment   []  pfm awareness - v/t cueing and facilitation for contract v relax, 100v50%; +breath cycle;  tension and compensation   []  fascial release   []      Education provided:   - anatomy/physiology of pelvic floor, posture/body mechanices and behavior modifications  Discussed progression of plan of care with patient; educated pt in activity modification; reviewed HEP with pt. Pt demonstrated and verbalized understanding of all instruction and was provided with a handout of HEP (see Patient Instructions).    Home Exercises Provided: Patient instructed to cont prior HEP.  Exercises were reviewed and Nguyen was able to demonstrate them prior to the end of the session.  Nguyen demonstrated good  understanding of the education provided.   See EMR under Patient Instructions for exercises provided 7/7/2022.    ASSESSMENT     Nguyen is doing well with management of body mechanics during higher level  activity, as well as with increasing tolerance to previously aggravating activities like volleyball. Pt also with some increased tolerance to and improved awareness to correct motor control or movement patterns  with repetitive activity. Pt remains limited by decreased mobility to hips, hamstrings, and post. chain with hip hinge patterns and load. Future treatment will focus on further motor control of postural and core awareness in all positions to promote increased comfort, confidence, independence, and safety, as well as, to improve  proprioception, endurance, coordination, and stability of core and pfm that will carryover to different tasks and adls.   Pt prognosis is Fair.   Pt will continue to benefit from skilled outpatient physical therapy to address the deficits listed in the problem list box on initial evaluation, provide pt/family education and to maximize pt's level of independence in the home and community environment.   Pt's spiritual, cultural and educational needs considered and pt agreeable to plan of care and goals.     Anticipated barriers to physical therapy: none    PLAN   Next Visit: post. chain and hip hinge mechanics     Plan of Care Re-Certification: 7/7/2022 to 7/7/2023.    Outpatient Physical Therapy 1 time(s) every 2-4 week(s) for 1 year prn weeks to include the following interventions: Gait Training, Manual Therapy, Neuromuscular Re-ed, Patient Education, Therapeutic Activities, Therapeutic Exercise, and dry needling.   Gissell Garza, PT,DPT

## 2022-08-02 NOTE — PLAN OF CARE
Pelvic Health Physical Therapy   Progress Note     Name: Nguyen Dimas; [id number]  Visit Date: 7/7/2022  Visit 5 of 24 Auth. Expiration: 12/31/2022 POC Exp.: 7/7/2022   Eval Date: 3/17/2022 Next PN: 6/2/2022 1 Cancels    FOTO: Issued Visit #: -/- Last PN: - - No Shows    Physician: Sasha Wood, *  Physician Orders: PT Eval and Treat R10.2  Therapy Diagnosis:   Encounter Diagnoses   Name Primary?    Pelvic pain Yes    Thoracic spine pain     Muscle spasm     Myalgia     Lower abdominal pain      Precautions: Standard    Time In/Out: 1330/1430  Total Billable Time: 60 minutes    SUBJECTIVE   Long Term Goals: 16 weeks   met   Pt will be able to sit at least 75' or during a movie without reproduction of sx.  met   Pt will be able to report 50% increase in productivity of coughing.  75%  Pt to report being able to complete a full day at work without significant increase in pain to demonstrate a return towards prior level of function.  50%  Pt to report no pain with palpation of abdominal wall due to improvement in soft tissue mobility from moderate to minimal restrictions.     Pt to report feeling confident in ability to assume appropriate mechanics during work 80% of the time.    Pt to be able to shower and dress without reproduction of sx at least 85% of time.    Pt reports: Has been doing well; did have a period with some increased pain but was able to manage it; also able to play volleyball 3-4x/2 days and aside from some increased back pain after a dive to ground was okay once she stopped playing.   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 Signed consent on file.     2 units of TherAct: 24 minutes  improve pfm during functional mobility and adls, to improve body mechanics during toileting, and to simulate physical requirement of exercise, work   [x]   modifications to body mechanics, sitting postures, and positioning for work   [x]   iap mgmt exhale some and breath hold as able with resistance exercsies   [x] hep mods for pelvis and hip dissociation     2 units of Neuro Re-ed.: 33 minutes  sensory re-education to increase optimal response of tissues to sensory stimuli encountered in daily task completion, improve quality of muscle recruitment, improve motor control and sequencing, and increase coordination   [x]   Coordination, Control, Posture, and Proprioception with hip hinge and airplane for pelvis and abdominals; +feebdack with ball for knee and hip; DL mod with band and v/t cueing for shins and lats   [x]   single arm row with 10#   v/t cueing for lat engagement and cervical mm relaxation   [x]  postural/core awareness - lower abdomen recruitment   []  pfm awareness - v/t cueing and facilitation for contract v relax, 100v50%; +breath cycle;  tension and compensation   []  fascial release   []      Education provided:   - anatomy/physiology of pelvic floor, posture/body mechanices and behavior modifications  Discussed progression of plan of care with patient; educated pt in activity modification; reviewed HEP with pt. Pt demonstrated and verbalized understanding of all instruction and was provided with a handout of HEP (see Patient Instructions).    Home Exercises Provided: Patient instructed to cont prior HEP.  Exercises were reviewed and Nguyen was able to demonstrate them prior to the end of the session.  Nguyen demonstrated good  understanding of the education provided.   See EMR under Patient Instructions for exercises provided 7/7/2022.    ASSESSMENT     Nguyen is doing well with management of body mechanics during higher level  activity, as well as with increasing tolerance to previously aggravating activities like volleyball. Pt also with some increased tolerance to and improved awareness to correct motor control or movement patterns  with repetitive activity. Pt remains limited by decreased mobility to hips, hamstrings, and post. chain with hip hinge patterns and load. Future treatment will focus on further motor control of postural and core awareness in all positions to promote increased comfort, confidence, independence, and safety, as well as, to improve  proprioception, endurance, coordination, and stability of core and pfm that will carryover to different tasks and adls.   Pt prognosis is Fair.   Pt will continue to benefit from skilled outpatient physical therapy to address the deficits listed in the problem list box on initial evaluation, provide pt/family education and to maximize pt's level of independence in the home and community environment.   Pt's spiritual, cultural and educational needs considered and pt agreeable to plan of care and goals.     Anticipated barriers to physical therapy: none    PLAN   Next Visit: post. chain and hip hinge mechanics     Plan of Care Re-Certification: 7/7/2022 to 7/7/2023.    Outpatient Physical Therapy 1 time(s) every 2-4 week(s) for 1 year prn weeks to include the following interventions: Gait Training, Manual Therapy, Neuromuscular Re-ed, Patient Education, Therapeutic Activities, Therapeutic Exercise, and dry needling.   Gissell Garza, PT,DPT

## 2022-09-07 ENCOUNTER — CLINICAL SUPPORT (OUTPATIENT)
Dept: REHABILITATION | Facility: HOSPITAL | Age: 47
End: 2022-09-07
Attending: FAMILY MEDICINE
Payer: COMMERCIAL

## 2022-09-07 DIAGNOSIS — M79.10 MYALGIA: ICD-10-CM

## 2022-09-07 DIAGNOSIS — R10.30 LOWER ABDOMINAL PAIN: ICD-10-CM

## 2022-09-07 DIAGNOSIS — M62.838 MUSCLE SPASM: ICD-10-CM

## 2022-09-07 DIAGNOSIS — M54.6 THORACIC SPINE PAIN: ICD-10-CM

## 2022-09-07 DIAGNOSIS — R10.2 PELVIC PAIN: Primary | ICD-10-CM

## 2022-09-07 PROCEDURE — 97530 THERAPEUTIC ACTIVITIES: CPT | Mod: PO

## 2022-09-07 PROCEDURE — 97112 NEUROMUSCULAR REEDUCATION: CPT | Mod: PO

## 2022-09-07 NOTE — PROGRESS NOTES
Pelvic Health Physical Therapy   Progress Note   Name: Nguyen Dimas; [id number]  Visit Date: 9/7/2022  Visit 6 of 24 Auth. Expiration: 12/31/2022 POC Exp.: 7/7/2022   Eval Date: 3/17/2022 Next PN: 10/5/2022 1 Cancels    FOTO: Issued Visit #: -/- Last PN: - - No Shows    Physician: Sasha Wood, *  Physician Orders: PT Eval and Treat R10.2  Therapy Diagnosis:   Encounter Diagnoses   Name Primary?    Pelvic pain Yes    Thoracic spine pain     Muscle spasm     Myalgia     Lower abdominal pain      Precautions: Standard    Time In/Out: 1530/1630  Total Billable Time: 60 minutes    SUBJECTIVE   Long Term Goals: 16 weeks   met   Pt will be able to sit at least 75' or during a movie without reproduction of sx.  met   Pt will be able to report 50% increase in productivity of coughing.  75%  Pt to report being able to complete a full day at work without significant increase in pain to demonstrate a return towards prior level of function.  50%  Pt to report no pain with palpation of abdominal wall due to improvement in soft tissue mobility from moderate to minimal restrictions.     Pt to report feeling confident in ability to assume appropriate mechanics during work 80% of the time.    Pt to be able to shower and dress without reproduction of sx at least 85% of time.    Pt reports: Doing well with exercise and work. [employer] has been great for IC- maybe once every 6mos or so  - sometimes still has some compression at low lumbar and upper sacral spine - stretch can make it better and then if standing again pain will come back  - started new job and is less stationary and lifting children less     She was compliant with home exercise program,  Functional change/Response to previous treatment: improved awareness, motor control; decreased pain    TODAY'S TREATMENT: 59 minutes    Pt verbally consents to today's tx. Signed consent on file.     2 units of TherAct: 35 minutes  improve pfm during functional mobility and  adls, to improve body mechanics during toileting, and to simulate physical requirement of exercise, work   [x]   modifications to body mechanics, sitting postures, and positioning for work   [x]   iap mgmt exhale some and breath hold as able with resistance exercsies   [x] hep mods for pelvis and hip dissociation - glute med wall press with apt, ppt, and hip hike     2 units of Neuro Re-ed.: 24 minutes  sensory re-education to increase optimal response of tissues to sensory stimuli encountered in daily task completion, improve quality of muscle recruitment, improve motor control and sequencing, and increase coordination   [x]   Coordination, Control, Posture, and Proprioception with hip hinge and airplane for pelvis and abdominals; verbal/tactile cueing at pelvis, glutes, ankles, +feebdack with roller for knee and hip   - difficulty with glute med recruitment to right    []   single arm row with 10#   v/t cueing for lat engagement and cervical mm relaxation   [x]  postural/core awareness - lower abdomen recruitment   []  pfm awareness - v/t cueing and facilitation for contract v relax, 100v50%; +breath cycle;  tension and compensation   [x]  fascial release with percussive massage to tfl and glute med, min, max    []      Education provided:   - anatomy/physiology of pelvic floor, posture/body mechanices and behavior modifications  Discussed progression of plan of care with patient; educated pt in activity modification; reviewed HEP with pt. Pt demonstrated and verbalized understanding of all instruction and was provided with a handout of HEP (see Patient Instructions).    Home Exercises Provided: Patient instructed to cont prior HEP.  Exercises were reviewed and Nguyen was able to demonstrate them prior to the end of the session.  Nguyen demonstrated good  understanding of the education provided.   See EMR under Patient Instructions for exercises provided 7/7/2022.    ASSESSMENT     Nguyen is doing well with  management of body mechanics during higher level activity, as well as with increasing tolerance to prolonged periods of inactivity, such as when driving. . Pt also with some increased tolerance to and improved awareness to correct motor control or movement patterns  with repetitive activity. Demonstrated mod difficulty with hip airplane and maintaining core and hip engagement with rotation more on right than left that improved with fascial and trp release to tfl and lateral hip mm demonstrated by improved awareness of trunk angle and core recruitment .   Pt prognosis is Fair.   Pt will continue to benefit from skilled outpatient physical therapy to address the deficits listed in the problem list box on initial evaluation, provide pt/family education and to maximize pt's level of independence in the home and community environment.   Pt's spiritual, cultural and educational needs considered and pt agreeable to plan of care and goals.     Anticipated barriers to physical therapy: none    PLAN   Next Visit: continue post. chain and hip hinge mechanics; single arm and lower body activity     Plan of Care Re-Certification:  to continue established POC    Outpatient Physical Therapy 1 time(s) every 2-4 week(s) as needed until 12/31/2022 to include the following interventions: Gait Training, Manual Therapy, Neuromuscular Re-ed, Patient Education, Therapeutic Activities, Therapeutic Exercise, and dry needling.   Gissell Garza, PT,DPT     I CERTIFY THE NEED FOR THESE SERVICES FURNISHED UNDER THIS PLAN OF TREATMENT AND WHILE UNDER MY CARE   Physician's comments:     Physician's Signature: ___________________________________________________

## 2022-09-19 NOTE — PLAN OF CARE
Pelvic Health Physical Therapy   Progress Note   Name: Nguyen Dimas; [id number]  Visit Date: 9/7/2022  Visit 6 of 24 Auth. Expiration: 12/31/2022 POC Exp.: 7/7/2022   Eval Date: 3/17/2022 Next PN: 10/5/2022 1 Cancels    FOTO: Issued Visit #: -/- Last PN: - - No Shows    Physician: Sasha Wood, *  Physician Orders: PT Eval and Treat R10.2  Therapy Diagnosis:   Encounter Diagnoses   Name Primary?    Pelvic pain Yes    Thoracic spine pain     Muscle spasm     Myalgia     Lower abdominal pain      Precautions: Standard    Time In/Out: 1530/1630  Total Billable Time: 60 minutes    SUBJECTIVE   Long Term Goals: 16 weeks   met   Pt will be able to sit at least 75' or during a movie without reproduction of sx.  met   Pt will be able to report 50% increase in productivity of coughing.  75%  Pt to report being able to complete a full day at work without significant increase in pain to demonstrate a return towards prior level of function.  50%  Pt to report no pain with palpation of abdominal wall due to improvement in soft tissue mobility from moderate to minimal restrictions.     Pt to report feeling confident in ability to assume appropriate mechanics during work 80% of the time.    Pt to be able to shower and dress without reproduction of sx at least 85% of time.    Pt reports: Doing well with exercise and work. [employer] has been great for IC- maybe once every 6mos or so  - sometimes still has some compression at low lumbar and upper sacral spine - stretch can make it better and then if standing again pain will come back  - started new job and is less stationary and lifting children less     She was compliant with home exercise program,  Functional change/Response to previous treatment: improved awareness, motor control; decreased pain    TODAY'S TREATMENT: 59 minutes    Pt verbally consents to today's tx. Signed consent on file.     2 units of TherAct: 35 minutes  improve pfm during functional mobility and  adls, to improve body mechanics during toileting, and to simulate physical requirement of exercise, work   [x]   modifications to body mechanics, sitting postures, and positioning for work   [x]   iap mgmt exhale some and breath hold as able with resistance exercsies   [x] hep mods for pelvis and hip dissociation - glute med wall press with apt, ppt, and hip hike     2 units of Neuro Re-ed.: 24 minutes  sensory re-education to increase optimal response of tissues to sensory stimuli encountered in daily task completion, improve quality of muscle recruitment, improve motor control and sequencing, and increase coordination   [x]   Coordination, Control, Posture, and Proprioception with hip hinge and airplane for pelvis and abdominals; verbal/tactile cueing at pelvis, glutes, ankles, +feebdack with roller for knee and hip   - difficulty with glute med recruitment to right    []   single arm row with 10#   v/t cueing for lat engagement and cervical mm relaxation   [x]  postural/core awareness - lower abdomen recruitment   []  pfm awareness - v/t cueing and facilitation for contract v relax, 100v50%; +breath cycle;  tension and compensation   [x]  fascial release with percussive massage to tfl and glute med, min, max    []      Education provided:   - anatomy/physiology of pelvic floor, posture/body mechanices and behavior modifications  Discussed progression of plan of care with patient; educated pt in activity modification; reviewed HEP with pt. Pt demonstrated and verbalized understanding of all instruction and was provided with a handout of HEP (see Patient Instructions).    Home Exercises Provided: Patient instructed to cont prior HEP.  Exercises were reviewed and Nguyen was able to demonstrate them prior to the end of the session.  Nguyen demonstrated good  understanding of the education provided.   See EMR under Patient Instructions for exercises provided 7/7/2022.    ASSESSMENT     Nguyen is doing well with  management of body mechanics during higher level activity, as well as with increasing tolerance to prolonged periods of inactivity, such as when driving. . Pt also with some increased tolerance to and improved awareness to correct motor control or movement patterns  with repetitive activity. Demonstrated mod difficulty with hip airplane and maintaining core and hip engagement with rotation more on right than left that improved with fascial and trp release to tfl and lateral hip mm demonstrated by improved awareness of trunk angle and core recruitment .   Pt prognosis is Fair.   Pt will continue to benefit from skilled outpatient physical therapy to address the deficits listed in the problem list box on initial evaluation, provide pt/family education and to maximize pt's level of independence in the home and community environment.   Pt's spiritual, cultural and educational needs considered and pt agreeable to plan of care and goals.     Anticipated barriers to physical therapy: none    PLAN   Next Visit: continue post. chain and hip hinge mechanics; single arm and lower body activity     Plan of Care Re-Certification:  to continue established POC    Outpatient Physical Therapy 1 time(s) every 2-4 week(s) as needed until 12/31/2022 to include the following interventions: Gait Training, Manual Therapy, Neuromuscular Re-ed, Patient Education, Therapeutic Activities, Therapeutic Exercise, and dry needling.   Gissell Garza, PT,DPT     I CERTIFY THE NEED FOR THESE SERVICES FURNISHED UNDER THIS PLAN OF TREATMENT AND WHILE UNDER MY CARE   Physician's comments:     Physician's Signature: ___________________________________________________

## 2022-10-12 ENCOUNTER — CLINICAL SUPPORT (OUTPATIENT)
Dept: REHABILITATION | Facility: HOSPITAL | Age: 47
End: 2022-10-12
Attending: FAMILY MEDICINE
Payer: COMMERCIAL

## 2022-10-12 DIAGNOSIS — M62.838 MUSCLE SPASM: ICD-10-CM

## 2022-10-12 DIAGNOSIS — M54.6 THORACIC SPINE PAIN: ICD-10-CM

## 2022-10-12 DIAGNOSIS — R10.2 PELVIC PAIN: Primary | ICD-10-CM

## 2022-10-12 DIAGNOSIS — R10.30 LOWER ABDOMINAL PAIN: ICD-10-CM

## 2022-10-12 DIAGNOSIS — M79.10 MYALGIA: ICD-10-CM

## 2022-10-12 NOTE — PLAN OF CARE
"  Pelvic Health Physical Therapy   Progress Note   Name: Nguyen Dimas; [id number]  Visit Date: 10/12/2022  Visit 7of 24 Auth. Expiration: 12/31/2022 POC Exp.: 12/31/2022   Eval Date: 3/17/2022 Next PN: 11/7/2022 1 Cancels    FOTO: Issued Visit #: -/- Last PN: - - No Shows    Physician: Sasha Wood, *  Physician Orders: PT Eval and Treat R10.2  Therapy Diagnosis:   Encounter Diagnoses   Name Primary?    Pelvic pain Yes    Thoracic spine pain     Muscle spasm     Myalgia     Lower abdominal pain      Precautions: Standard    Time In/Out: 1530/1630  Total Billable Time: 60 minutes    SUBJECTIVE   Long Term Goals: 16 weeks   met   Pt will be able to sit at least 75' or during a movie without reproduction of sx.  met   Pt will be able to report 50% increase in productivity of coughing.  75%  Pt to report being able to complete a full day at work without significant increase in pain to demonstrate a return towards prior level of function.  50%  Pt to report no pain with palpation of abdominal wall due to improvement in soft tissue mobility from moderate to minimal restrictions.     Pt to report feeling confident in ability to assume appropriate mechanics during work 80% of the time.    Pt to be able to shower and dress without reproduction of sx at least 85% of time.    Pt reports: Has still not been doing many hinge type exercises 2/2 low back pain, but has been having some sit bone "sensation" more on the right>left  noticed with singe leg exercise and activity throughout the day;      She was compliant with home exercise program,  Functional change/Response to previous treatment: improved awareness, motor control; decreased pain    TODAY'S TREATMENT: 60 minutes    Pt verbally consents to today's tx. Signed consent on file.     2 units of TherAct: 25 minutes  improve pfm during functional mobility and adls, to improve body mechanics during toileting, and to simulate physical requirement of exercise, work "   [x]   modifications to body mechanics, sitting postures, and positioning for work   [x]   iap mgmt exhale some and breath hold as able with resistance exercsies   [x] hep - hinge mechanics  modified for gym with limited range of motion for increased control and proprioception     2 units of Neuro Re-ed.: 35 minutes  sensory re-education to increase optimal response of tissues to sensory stimuli encountered in daily task completion, improve quality of muscle recruitment, improve motor control and sequencing, and increase coordination, control, posture, and proprioception   [x]   hip hinge with verbal/tactile cueing for pelvic alignment, hip shift, and mm engagement of lats, glutes, and adductors   -  unweighted, weighted-4#  - rdl +rotation, staggered stance; isometric at door  - hip internal rotation @90* on left with hip hike aciheive increased range of motion and position at 90/90; mary to perform eccentric contractions    -[]   single arm row with 10#   v/t cueing for lat engagement and cervical mm relaxation   [x]  postural/core awareness and hip shifting backward with counter balance at knees and ankles   - tactile cueing for relaxation  - lat engagement and tactile cueing   []  pfm awareness - v/t cueing and facilitation for contract v relax, 100v50%; +breath cycle;  tension and compensation   []  fascial release with percussive massage to tfl and glute med, min, max    []      Education provided:   - anatomy/physiology of pelvic floor, posture/body mechanices and behavior modifications  Discussed progression of plan of care with patient; educated pt in activity modification; reviewed HEP with pt. Pt demonstrated and verbalized understanding of all instruction and was provided with a handout of HEP (see Patient Instructions).    Home Exercises Provided: Patient instructed to cont prior HEP.  Exercises were reviewed and Nguyen was able to demonstrate them prior to the end of the session.  Nguyen demonstrated  good  understanding of the education provided.   See EMR under Patient Instructions for exercises provided 7/7/2022.    ASSESSMENT     Nguyen had some challenge with engaging hip internal rotation musculature in loaded positions and in single leg activity. Limited mobility to hip/pelvis with abduction during a hinge, as well as with single leg. With verbal/guided tactile cueing was able to increase proprioception to hip area with minimal compensation at pelvis and knee. Most success with cueing for increasing knee flexion to left with increased post. hip hinge on right and was able to disengage fear of hurting back throughout the hinge movement by end of session.    Pt prognosis is Fair.   Pt will continue to benefit from skilled outpatient physical therapy to address the deficits listed in the problem list box on initial evaluation, provide pt/family education and to maximize pt's level of independence in the home and community environment.   Pt's spiritual, cultural and educational needs considered and pt agreeable to plan of care and goals.     Anticipated barriers to physical therapy: none    PLAN   Next Visit: continue post. chain and hip hinge mechanics; single arm and lower body activity     Plan of Care Re-Certification:  to continue established POC    Outpatient Physical Therapy 1 time(s) every 2-4 week(s) as needed until 12/31/2022 to include the following interventions: Gait Training, Manual Therapy, Neuromuscular Re-ed, Patient Education, Therapeutic Activities, Therapeutic Exercise, and dry needling.   Gissell Garza, PT,DPT     I CERTIFY THE NEED FOR THESE SERVICES FURNISHED UNDER THIS PLAN OF TREATMENT AND WHILE UNDER MY CARE   Physician's comments:     Physician's Signature: ___________________________________________________

## 2022-10-12 NOTE — PROGRESS NOTES
"  Pelvic Health Physical Therapy   Progress Note   Name: Nguyen Dimas; [id number]  Visit Date: 10/12/2022  Visit 7of 24 Auth. Expiration: 12/31/2022 POC Exp.: 12/31/2022   Eval Date: 3/17/2022 Next PN: 11/7/2022 1 Cancels    FOTO: Issued Visit #: -/- Last PN: - - No Shows    Physician: Sasha Wood, *  Physician Orders: PT Eval and Treat R10.2  Therapy Diagnosis:   Encounter Diagnoses   Name Primary?    Pelvic pain Yes    Thoracic spine pain     Muscle spasm     Myalgia     Lower abdominal pain      Precautions: Standard    Time In/Out: 1530/1630  Total Billable Time: 60 minutes    SUBJECTIVE   Long Term Goals: 16 weeks   met   Pt will be able to sit at least 75' or during a movie without reproduction of sx.  met   Pt will be able to report 50% increase in productivity of coughing.  75%  Pt to report being able to complete a full day at work without significant increase in pain to demonstrate a return towards prior level of function.  50%  Pt to report no pain with palpation of abdominal wall due to improvement in soft tissue mobility from moderate to minimal restrictions.     Pt to report feeling confident in ability to assume appropriate mechanics during work 80% of the time.    Pt to be able to shower and dress without reproduction of sx at least 85% of time.    Pt reports: Has still not been doing many hinge type exercises 2/2 low back pain, but has been having some sit bone "sensation" more on the right>left  noticed with singe leg exercise and activity throughout the day;      She was compliant with home exercise program,  Functional change/Response to previous treatment: improved awareness, motor control; decreased pain    TODAY'S TREATMENT: 60 minutes    Pt verbally consents to today's tx. Signed consent on file.     2 units of TherAct: 25 minutes  improve pfm during functional mobility and adls, to improve body mechanics during toileting, and to simulate physical requirement of exercise, work "   [x]   modifications to body mechanics, sitting postures, and positioning for work   [x]   iap mgmt exhale some and breath hold as able with resistance exercsies   [x] hep - hinge mechanics  modified for gym with limited range of motion for increased control and proprioception     2 units of Neuro Re-ed.: 35 minutes  sensory re-education to increase optimal response of tissues to sensory stimuli encountered in daily task completion, improve quality of muscle recruitment, improve motor control and sequencing, and increase coordination, control, posture, and proprioception   [x]   hip hinge with verbal/tactile cueing for pelvic alignment, hip shift, and mm engagement of lats, glutes, and adductors   -  unweighted, weighted-4#  - rdl +rotation, staggered stance; isometric at door  - hip internal rotation @90* on left with hip hike aciheive increased range of motion and position at 90/90; mary to perform eccentric contractions    -[]   single arm row with 10#   v/t cueing for lat engagement and cervical mm relaxation   [x]  postural/core awareness and hip shifting backward with counter balance at knees and ankles   - tactile cueing for relaxation  - lat engagement and tactile cueing   []  pfm awareness - v/t cueing and facilitation for contract v relax, 100v50%; +breath cycle;  tension and compensation   []  fascial release with percussive massage to tfl and glute med, min, max    []      Education provided:   - anatomy/physiology of pelvic floor, posture/body mechanices and behavior modifications  Discussed progression of plan of care with patient; educated pt in activity modification; reviewed HEP with pt. Pt demonstrated and verbalized understanding of all instruction and was provided with a handout of HEP (see Patient Instructions).    Home Exercises Provided: Patient instructed to cont prior HEP.  Exercises were reviewed and Nguyen was able to demonstrate them prior to the end of the session.  Nguyen demonstrated  good  understanding of the education provided.   See EMR under Patient Instructions for exercises provided 7/7/2022.    ASSESSMENT     Nguyen had some challenge with engaging hip internal rotation musculature in loaded positions and in single leg activity. Limited mobility to hip/pelvis with abduction during a hinge, as well as with single leg. With verbal/guided tactile cueing was able to increase proprioception to hip area with minimal compensation at pelvis and knee. Most success with cueing for increasing knee flexion to left with increased post. hip hinge on right and was able to disengage fear of hurting back throughout the hinge movement by end of session.    Pt prognosis is Fair.   Pt will continue to benefit from skilled outpatient physical therapy to address the deficits listed in the problem list box on initial evaluation, provide pt/family education and to maximize pt's level of independence in the home and community environment.   Pt's spiritual, cultural and educational needs considered and pt agreeable to plan of care and goals.     Anticipated barriers to physical therapy: none    PLAN   Next Visit: continue post. chain and hip hinge mechanics; single arm and lower body activity     Plan of Care Re-Certification:  to continue established POC    Outpatient Physical Therapy 1 time(s) every 2-4 week(s) as needed until 12/31/2022 to include the following interventions: Gait Training, Manual Therapy, Neuromuscular Re-ed, Patient Education, Therapeutic Activities, Therapeutic Exercise, and dry needling.   Gissell Garza, PT,DPT     I CERTIFY THE NEED FOR THESE SERVICES FURNISHED UNDER THIS PLAN OF TREATMENT AND WHILE UNDER MY CARE   Physician's comments:     Physician's Signature: ___________________________________________________

## 2023-01-04 ENCOUNTER — CLINICAL SUPPORT (OUTPATIENT)
Dept: REHABILITATION | Facility: HOSPITAL | Age: 48
End: 2023-01-04
Attending: FAMILY MEDICINE
Payer: COMMERCIAL

## 2023-01-04 DIAGNOSIS — R10.30 LOWER ABDOMINAL PAIN: ICD-10-CM

## 2023-01-04 DIAGNOSIS — M62.838 MUSCLE SPASM: ICD-10-CM

## 2023-01-04 DIAGNOSIS — R10.2 PELVIC AND PERINEAL PAIN: ICD-10-CM

## 2023-01-04 DIAGNOSIS — M54.6 THORACIC SPINE PAIN: ICD-10-CM

## 2023-01-04 DIAGNOSIS — R10.2 PELVIC PAIN: Primary | ICD-10-CM

## 2023-01-04 DIAGNOSIS — M79.10 MYALGIA: ICD-10-CM

## 2023-01-04 PROCEDURE — 97112 NEUROMUSCULAR REEDUCATION: CPT | Mod: PO

## 2023-01-04 PROCEDURE — 97530 THERAPEUTIC ACTIVITIES: CPT | Mod: PO

## 2023-01-04 NOTE — PROGRESS NOTES
Pelvic Health Physical Therapy   Progress/Daily Note   Name: Nguyen Dimas; [id number]  Visit Date: 1/4/2023  Visit 8 of 24 Auth. Expiration: 12/29/2023 POC Exp.: 4/5/2023   Eval Date: 3/17/2022 Next PN: 2/57443 1 Cancels    FOTO: Issued Visit #: -/- Last PN: - - No Shows    Physician: Sasha Wood, *  Physician Orders: PT Eval and Treat R10.2  Therapy Diagnosis:   Encounter Diagnoses   Name Primary?    Pelvic pain Yes    Thoracic spine pain     Muscle spasm     Myalgia     Lower abdominal pain     Pelvic and perineal pain      Precautions: Standard    Time In/Out: 1530/1630  Total Billable Time: 60 minutes    Subjective   Long Term Goals: 16 weeks   met   Pt will be able to sit at least 75' or during a movie without reproduction of sx.  met   Pt will be able to report 50% increase in productivity of coughing.  75%  Pt to report being able to complete a full day at work without significant increase in pain to demonstrate a return towards prior level of function.  50%  Pt to report no pain with palpation of abdominal wall due to improvement in soft tissue mobility from moderate to minimal restrictions.     Pt to report feeling confident in ability to assume appropriate mechanics during work 80% of the time.    Pt to be able to shower and dress without reproduction of sx at least 85% of time.    Pt reports: has been doing well until a couple days ago when felt back tighten and release and was okay after a day   She was compliant with home exercise program, fascial massage  Functional change/Response to previous treatment: improved awareness, motor control; decreased pain    Today's Treatment: 60 minutes   Pt verbally consents to today's tx. Signed consent on file.     1 units of TherAct: 15 minutes  improve pfm during functional mobility and adls, to improve body mechanics during toileting, and to simulate physical requirement of exercise, work   [x]   visceral mobilization of sigmoid and abdominal  femoral n fascial release    []   iap mgmt   [x] Education Provided:  behavior modifications and exercise/activity modification   [] Home Exercises Provided: Patient instructed to cont prior HEP.  Exercises were reviewed and Nguyen was able to demonstrate them prior to the end of the session.         3 units of Neuro Re-ed.: 45 minutes  sensory re-education to increase optimal response of tissues to sensory stimuli encountered in daily task completion, improve quality of muscle recruitment, improve motor control and sequencing, and increase coordination, control, posture, and proprioception   []   Coordination, Control, Posture, and Proprioception    []   rib breath   [x]  postural/core awareness   []  pfm awareness - v/t cueing and facilitation for contract v relax, 100v50%; +breath cycle;  tension and compensation   [x]  fascial release to lumbosacral area, sacrum, lateral dorsal fascia; abdominal wall and sigmoid   [x] pelvic floor muscles downtraining +coordination of relaxation or push/lengthening contraction with pelvic and hip range of motion for motor control and nerve mobility: pelvic tilts,        [x] Education Provided:  posture/body mechanics, rib breathing, and isometric abdominal exercises         Education provided:   - anatomy/physiology of pelvic floor, posture/body mechanices and behavior modifications  Discussed progression of plan of care with patient; educated pt in activity modification; reviewed HEP with pt. Pt demonstrated and verbalized understanding of all instruction and was provided with a handout of HEP (see Patient Instructions).    Home Exercises Provided: Patient instructed to cont prior HEP.  Exercises were reviewed and Nguyen was able to demonstrate them prior to the end of the session.  Nguyen demonstrated good  understanding of the education provided.   See EMR under Patient Instructions for exercises provided 7/7/2022.    Assessment     Denies returns following a lapse in care  due to scheduling difficulty with no new complaints. Did have an episode of back pain that resolved shortly after fascial release to area. This visit fascial assessment with sig restrictions throughout lumbosacral area left>right. Additionally hip ext limited on left>right in sls. Post lumbar  with lateral dorsal fascial release and femoral n. release patient able to maintain balance with sls and hip ext, and perform apt and ppt with mild decrease in pain at lumbosacral area.   Nguyen demonstrated and verbalized  good  understanding of instruction and education provided for abdominal/hypogastric fascial release and understanding of all instruction and was not provided with a handout of HEP.  See EMR under Patient Instructions for exercises provided prior visit.  Pt prognosis is Fair.   Pt will continue to benefit from skilled outpatient physical therapy to address the deficits listed in the problem list box on initial evaluation, provide pt/family education and to maximize pt's level of independence in the home, gym, and worl/community environment.   Pt's spiritual, cultural and educational needs considered and pt agreeable to plan of care and goals.     Anticipated barriers to physical therapy: scheduling availabilty    Next Visit: continue post. chain and hip hinge mechanics; single arm and lower body activity   Plan   Next Visit: 2/27/2023 -  CONT fascial work for lumbar plexus     Plan of Care Re-Certification: 1/4/2023 to 4/5/2023.    Outpatient Physical Therapy 1 time(s) every 2-4 week(s) as needed through 4/5/2023 to include the following interventions: Gait Training, Manual Therapy, Neuromuscular Re-ed, Patient Education, Therapeutic Activities, Therapeutic Exercise, and dry needling.     Gissell Garza, PT ,DPT

## 2023-01-05 NOTE — PLAN OF CARE
Pelvic Health Physical Therapy   Progress/Daily Note   Name: Nguyen Dimas; [id number]  Visit Date: 1/4/2023  Visit 8 of 24 Auth. Expiration: 12/29/2023 POC Exp.: 4/5/2023   Eval Date: 3/17/2022 Next PN: 2/03277 1 Cancels    FOTO: Issued Visit #: -/- Last PN: - - No Shows    Physician: Sasha Wood, *  Physician Orders: PT Eval and Treat R10.2  Therapy Diagnosis:   Encounter Diagnoses   Name Primary?    Pelvic pain Yes    Thoracic spine pain     Muscle spasm     Myalgia     Lower abdominal pain     Pelvic and perineal pain      Precautions: Standard    Time In/Out: 1530/1630  Total Billable Time: 60 minutes    Subjective   Long Term Goals: 16 weeks   met   Pt will be able to sit at least 75' or during a movie without reproduction of sx.  met   Pt will be able to report 50% increase in productivity of coughing.  75%  Pt to report being able to complete a full day at work without significant increase in pain to demonstrate a return towards prior level of function.  50%  Pt to report no pain with palpation of abdominal wall due to improvement in soft tissue mobility from moderate to minimal restrictions.     Pt to report feeling confident in ability to assume appropriate mechanics during work 80% of the time.    Pt to be able to shower and dress without reproduction of sx at least 85% of time.    Pt reports: has been doing well until a couple days ago when felt back tighten and release and was okay after a day   She was compliant with home exercise program, fascial massage  Functional change/Response to previous treatment: improved awareness, motor control; decreased pain    Today's Treatment: 60 minutes   Pt verbally consents to today's tx. Signed consent on file.     1 units of TherAct: 15 minutes  improve pfm during functional mobility and adls, to improve body mechanics during toileting, and to simulate physical requirement of exercise, work   [x]   visceral mobilization of sigmoid and abdominal  femoral n fascial release    []   iap mgmt   [x] Education Provided:  behavior modifications and exercise/activity modification   [] Home Exercises Provided: Patient instructed to cont prior HEP.  Exercises were reviewed and Nguyen was able to demonstrate them prior to the end of the session.         3 units of Neuro Re-ed.: 45 minutes  sensory re-education to increase optimal response of tissues to sensory stimuli encountered in daily task completion, improve quality of muscle recruitment, improve motor control and sequencing, and increase coordination, control, posture, and proprioception   []   Coordination, Control, Posture, and Proprioception    []   rib breath   [x]  postural/core awareness   []  pfm awareness - v/t cueing and facilitation for contract v relax, 100v50%; +breath cycle;  tension and compensation   [x]  fascial release to lumbosacral area, sacrum, lateral dorsal fascia; abdominal wall and sigmoid   [x] pelvic floor muscles downtraining +coordination of relaxation or push/lengthening contraction with pelvic and hip range of motion for motor control and nerve mobility: pelvic tilts,        [x] Education Provided:  posture/body mechanics, rib breathing, and isometric abdominal exercises         Education provided:   - anatomy/physiology of pelvic floor, posture/body mechanices and behavior modifications  Discussed progression of plan of care with patient; educated pt in activity modification; reviewed HEP with pt. Pt demonstrated and verbalized understanding of all instruction and was provided with a handout of HEP (see Patient Instructions).    Home Exercises Provided: Patient instructed to cont prior HEP.  Exercises were reviewed and Nguyen was able to demonstrate them prior to the end of the session.  Nguyen demonstrated good  understanding of the education provided.   See EMR under Patient Instructions for exercises provided 7/7/2022.    Assessment     Denies returns following a lapse in care  due to scheduling difficulty with no new complaints. Did have an episode of back pain that resolved shortly after fascial release to area. This visit fascial assessment with sig restrictions throughout lumbosacral area left>right. Additionally hip ext limited on left>right in sls. Post lumbar  with lateral dorsal fascial release and femoral n. release patient able to maintain balance with sls and hip ext, and perform apt and ppt with mild decrease in pain at lumbosacral area.   Nguyen demonstrated and verbalized  good  understanding of instruction and education provided for abdominal/hypogastric fascial release and understanding of all instruction and was not provided with a handout of HEP.  See EMR under Patient Instructions for exercises provided prior visit.  Pt prognosis is Fair.   Pt will continue to benefit from skilled outpatient physical therapy to address the deficits listed in the problem list box on initial evaluation, provide pt/family education and to maximize pt's level of independence in the home, gym, and worl/community environment.   Pt's spiritual, cultural and educational needs considered and pt agreeable to plan of care and goals.     Anticipated barriers to physical therapy: scheduling availabilty    Next Visit: continue post. chain and hip hinge mechanics; single arm and lower body activity   Plan   Next Visit: 2/27/2023 -  CONT fascial work for lumbar plexus     Plan of Care Re-Certification: 1/4/2023 to 4/5/2023.    Outpatient Physical Therapy 1 time(s) every 2-4 week(s) as needed through 4/5/2023 to include the following interventions: Gait Training, Manual Therapy, Neuromuscular Re-ed, Patient Education, Therapeutic Activities, Therapeutic Exercise, and dry needling.     Gissell Garza, PT ,DPT

## 2023-03-01 ENCOUNTER — CLINICAL SUPPORT (OUTPATIENT)
Dept: REHABILITATION | Facility: HOSPITAL | Age: 48
End: 2023-03-01
Payer: COMMERCIAL

## 2023-03-01 DIAGNOSIS — R10.2 PELVIC PAIN: Primary | ICD-10-CM

## 2023-03-01 DIAGNOSIS — M79.10 MYALGIA: ICD-10-CM

## 2023-03-01 DIAGNOSIS — R10.30 LOWER ABDOMINAL PAIN: ICD-10-CM

## 2023-03-01 DIAGNOSIS — M54.6 THORACIC SPINE PAIN: ICD-10-CM

## 2023-03-01 DIAGNOSIS — M62.838 MUSCLE SPASM: ICD-10-CM

## 2023-03-01 PROCEDURE — 97112 NEUROMUSCULAR REEDUCATION: CPT | Mod: PO

## 2023-03-01 PROCEDURE — 97110 THERAPEUTIC EXERCISES: CPT | Mod: PO

## 2023-03-01 PROCEDURE — 97530 THERAPEUTIC ACTIVITIES: CPT | Mod: PO

## 2023-03-01 NOTE — PROGRESS NOTES
Pelvic Health Physical Therapy   Progress and Daily Note     Name: Nguyen Dimas; [id number]  Visit Date: 3/1/2023; 9/2 of 20  Auth. Exp.: 12/29/2023 Progress Note: 3/29/2023   POC Exp.: 4/5/2023 1 Cx/ - NS   Eval Date: 3/17/2023 Precautions: Standard   FOTO#: -/- (-)    Physician: Sasha Wood, *  Physician Orders: PT Eval and Treat  Therapy Diagnosis:   Encounter Diagnoses   Name Primary?    Pelvic pain Yes    Thoracic spine pain     Muscle spasm     Myalgia     Lower abdominal pain    Precautions: Standard    Time In/Out: 1645/1745  Total Billable Time: 60 minutes    Subjective   Long Term Goals: 16 weeks   met   Pt will be able to sit at least 75' or during a movie without reproduction of sx.  met   Pt will be able to report 50% increase in productivity of coughing.  75%  Pt to report being able to complete a full day at work without significant increase in pain to demonstrate a return towards prior level of function.  50%  Pt to report no pain with palpation of abdominal wall due to improvement in soft tissue mobility from moderate to minimal restrictions.    25%  Pt to report feeling confident in ability to assume appropriate mechanics during work 80% of the time.   25%  Pt to be able to shower and dress without reproduction of sx at least 85% of time.    Pt reports: Had some increased pain in the past week or so after returning to work following spring break  - also had ankle sprain 7 weeks ago and is returning to plof following that  - some increased hypogastric and inguinal pain on left with menstrual cycle due to endometriomas @ovary and sigmoid colon; does sometimes have stool with brb with this   She was compliant with home exercise program, fascial massage  Functional change/Response to previous treatment: improved awareness, motor control; decreased pain    Today's Treatment: 60 minutes   Pt verbally consents to today's tx. Signed consent on file.     Intervention 3/1/2023   Thera  Act.  Education Provided:  anatomy/physiology of pelvic floor, toileting mechanics/and exercise/activity modification visceral mobilization of > X sigmoid    Bowel/stool consistency nutrition X    modifications for sitting at work  - pillow support, cushions, serola trial  X serola - improved comfort    to improve pfm and body mechanics during functional mobility and actvities of daily living and simulate physical requirement of exercise, work for 15'' minutes     Neuro   Education Provided:  posture/body mechanics, isometric abdominal exercises, and balance/proprioception   TrA coordination X    Fascial release to abdominopelvic > X left hypogastric, fascial release to femoral n, obturator n. and membrane    Obturator externus release X    sls +hip ext; lunge +hop; lunge+hip internal rotation  lunge +ball toss/catch bilaterally  X +verbal/tactile cueing, facilitation for glute and hip stab. bilaterally (left>right )              improve quality of muscle recruitment, motor control, sequencing,coordination, posture, proprioception  sensory re-education for optimal tissue response of tissues to stimuli during actvities of daily living for  30' minutes     Therapeutic Ex.  strength and endurance   Lunge +band- 10x; +hip internal rotation 10x bilaterally  X     goblet march 10#; kb press 5# bilaterally  X 10x    for  15 minutes        Total Charges  1 ta, 2 ne, 1 te   Home Exercises Provided: yes.  Exercises were reviewed and Nguyen Dimas was able to demonstrate them prior to the end of the session.         Education provided:   - anatomy/physiology of pelvic floor, posture/body mechanices and behavior modifications  Discussed progression of plan of care with patient; educated pt in activity modification; reviewed HEP with pt. Pt demonstrated and verbalized understanding of all instruction and was provided with a handout of HEP (see Patient Instructions).    Home Exercises Provided: Patient instructed to cont prior  HEP.  Exercises were reviewed and Nguyen was able to demonstrate them prior to the end of the session.  Nguyen demonstrated good  understanding of the education provided.   See EMR under Patient Instructions for exercises provided  prior visit .    Assessment     Nguyen reports pain at left lower quadrant consistent with menstrual ycl.e Did have some increased pain to same area following lastvisit. This visit with lunges and hip internal rotation challneged more on left>right (decreased stabiltiy with valgus at left knee, difficulty maintaining 1st mtp contact to floor, increasing thoracic spine rotation). Improved following obturator membrane release on left. Additionally sigmoid colon and left lower quadrant fascia released from asis with increased mobility to area and decreased tensiton to lateral left hip and inguinal area, despite rigidity of femoral nerive consisttent with patient report of endometrioma pain at left ovary and sigmoid. Patient also challenged with rotatinal activity at hip with, but has sig improved in hip ext, thoracic spine range of motion, and coordinaitng bilateral lower extremities in frontal and sagittal plane activity.    Nguyen demonstrated and verbalized  good  understanding of instruction and education provided for abdominal/hypogastric fascial release and understanding of all instruction and was not provided with a handout of HEP.  See EMR under Patient Instructions for exercises provided prior visit.  Pt prognosis is Fair.   Pt will continue to benefit from skilled outpatient physical therapy to address the deficits listed in the problem list box on initial evaluation, provide pt/family education and to maximize pt's level of independence in the home, gym, and worl/community environment.   Pt's spiritual, cultural and educational needs considered and pt agreeable to plan of care and goals.     Anticipated barriers to physical therapy: scheduling availabilty    Plan   Next Visit:  3/27/2023 -  obturator, hypogastric area, rotational work     Plan of Care Re-Certification: 1/4/2023 to 4/5/2023.    Outpatient Physical Therapy 1 time(s) every 2-4 week(s) as needed through 4/5/2023 to include the following interventions: Gait Training, Manual Therapy, Neuromuscular Re-ed, Patient Education, Therapeutic Activities, Therapeutic Exercise, and dry needling.     Gissell Garza, PT ,DPT        I CERTIFY THE NEED FOR THESE SERVICES FURNISHED UNDER THIS PLAN OF TREATMENT AND WHILE UNDER MY CARE   Physician's comments:     Physician's Signature: ___________________________________________________

## 2023-03-02 NOTE — PLAN OF CARE
Pelvic Health Physical Therapy   Progress and Daily Note     Name: Nguyen Dimas; [id number]  Visit Date: 3/1/2023; 9/2 of 20  Auth. Exp.: 12/29/2023 Progress Note: 3/29/2023   POC Exp.: 4/5/2023 1 Cx/ - NS   Eval Date: 3/17/2023 Precautions: Standard   FOTO#: -/- (-)    Physician: Sasha Wood, *  Physician Orders: PT Eval and Treat  Therapy Diagnosis:   Encounter Diagnoses   Name Primary?    Pelvic pain Yes    Thoracic spine pain     Muscle spasm     Myalgia     Lower abdominal pain    Precautions: Standard    Time In/Out: 1645/1745  Total Billable Time: 60 minutes    Subjective   Long Term Goals: 16 weeks   met   Pt will be able to sit at least 75' or during a movie without reproduction of sx.  met   Pt will be able to report 50% increase in productivity of coughing.  75%  Pt to report being able to complete a full day at work without significant increase in pain to demonstrate a return towards prior level of function.  50%  Pt to report no pain with palpation of abdominal wall due to improvement in soft tissue mobility from moderate to minimal restrictions.    25%  Pt to report feeling confident in ability to assume appropriate mechanics during work 80% of the time.   25%  Pt to be able to shower and dress without reproduction of sx at least 85% of time.    Pt reports: Had some increased pain in the past week or so after returning to work following spring break  - also had ankle sprain 7 weeks ago and is returning to plof following that  - some increased hypogastric and inguinal pain on left with menstrual cycle due to endometriomas @ovary and sigmoid colon; does sometimes have stool with brb with this   She was compliant with home exercise program, fascial massage  Functional change/Response to previous treatment: improved awareness, motor control; decreased pain    Today's Treatment: 60 minutes   Pt verbally consents to today's tx. Signed consent on file.     Intervention 3/1/2023   Thera  Act.  Education Provided:  anatomy/physiology of pelvic floor, toileting mechanics/and exercise/activity modification visceral mobilization of > X sigmoid    Bowel/stool consistency nutrition X    modifications for sitting at work  - pillow support, cushions, serola trial  X serola - improved comfort    to improve pfm and body mechanics during functional mobility and actvities of daily living and simulate physical requirement of exercise, work for 15'' minutes     Neuro   Education Provided:  posture/body mechanics, isometric abdominal exercises, and balance/proprioception   TrA coordination X    Fascial release to abdominopelvic > X left hypogastric, fascial release to femoral n, obturator n. and membrane    Obturator externus release X    sls +hip ext; lunge +hop; lunge+hip internal rotation  lunge +ball toss/catch bilaterally  X +verbal/tactile cueing, facilitation for glute and hip stab. bilaterally (left>right )              improve quality of muscle recruitment, motor control, sequencing,coordination, posture, proprioception  sensory re-education for optimal tissue response of tissues to stimuli during actvities of daily living for  30' minutes     Therapeutic Ex.  strength and endurance   Lunge +band- 10x; +hip internal rotation 10x bilaterally  X     goblet march 10#; kb press 5# bilaterally  X 10x    for  15 minutes        Total Charges  1 ta, 2 ne, 1 te   Home Exercises Provided: yes.  Exercises were reviewed and Nguyen Dimas was able to demonstrate them prior to the end of the session.         Education provided:   - anatomy/physiology of pelvic floor, posture/body mechanices and behavior modifications  Discussed progression of plan of care with patient; educated pt in activity modification; reviewed HEP with pt. Pt demonstrated and verbalized understanding of all instruction and was provided with a handout of HEP (see Patient Instructions).    Home Exercises Provided: Patient instructed to cont prior  HEP.  Exercises were reviewed and Nguyen was able to demonstrate them prior to the end of the session.  Nguyen demonstrated good  understanding of the education provided.   See EMR under Patient Instructions for exercises provided prior visit.    Assessment     Nguyen reports pain at left lower quadrant consistent with menstrual ycl.e Did have some increased pain to same area following lastvisit. This visit with lunges and hip internal rotation challneged more on left>right (decreased stabiltiy with valgus at left knee, difficulty maintaining 1st mtp contact to floor, increasing thoracic spine rotation). Improved following obturator membrane release on left. Additionally sigmoid colon and left lower quadrant fascia released from asis with increased mobility to area and decreased tensiton to lateral left hip and inguinal area, despite rigidity of femoral nerive consisttent with patient report of endometrioma pain at left ovary and sigmoid. Patient also challenged with rotatinal activity at hip with, but has sig improved in hip ext, thoracic spine range of motion, and coordinaitng bilateral lower extremities in frontal and sagittal plane activity.    Nguyen demonstrated and verbalized  good  understanding of instruction and education provided for abdominal/hypogastric fascial release and understanding of all instruction and was not provided with a handout of HEP.  See EMR under Patient Instructions for exercises provided prior visit.  Pt prognosis is Fair.   Pt will continue to benefit from skilled outpatient physical therapy to address the deficits listed in the problem list box on initial evaluation, provide pt/family education and to maximize pt's level of independence in the home, gym, and worl/community environment.   Pt's spiritual, cultural and educational needs considered and pt agreeable to plan of care and goals.     Anticipated barriers to physical therapy: scheduling availabilty    Plan   Next Visit:  3/27/2023 -  obturator, hypogastric area, rotational work     Plan of Care Re-Certification: 1/4/2023 to 4/5/2023.    Outpatient Physical Therapy 1 time(s) every 2-4 week(s) as needed through 4/5/2023 to include the following interventions: Gait Training, Manual Therapy, Neuromuscular Re-ed, Patient Education, Therapeutic Activities, Therapeutic Exercise, and dry needling.     Gissell Garza, PT ,DPT        I CERTIFY THE NEED FOR THESE SERVICES FURNISHED UNDER THIS PLAN OF TREATMENT AND WHILE UNDER MY CARE   Physician's comments:     Physician's Signature: ___________________________________________________

## 2023-03-27 ENCOUNTER — CLINICAL SUPPORT (OUTPATIENT)
Dept: REHABILITATION | Facility: HOSPITAL | Age: 48
End: 2023-03-27
Payer: COMMERCIAL

## 2023-03-27 DIAGNOSIS — M54.6 THORACIC SPINE PAIN: ICD-10-CM

## 2023-03-27 DIAGNOSIS — R10.2 PELVIC PAIN: Primary | ICD-10-CM

## 2023-03-27 DIAGNOSIS — M62.838 MUSCLE SPASM: ICD-10-CM

## 2023-03-27 DIAGNOSIS — R10.30 LOWER ABDOMINAL PAIN: ICD-10-CM

## 2023-03-27 DIAGNOSIS — M79.10 MYALGIA: ICD-10-CM

## 2023-03-27 PROCEDURE — 97530 THERAPEUTIC ACTIVITIES: CPT | Mod: PO

## 2023-03-27 PROCEDURE — 97110 THERAPEUTIC EXERCISES: CPT | Mod: PO

## 2023-03-27 PROCEDURE — 97112 NEUROMUSCULAR REEDUCATION: CPT | Mod: PO

## 2023-03-27 PROCEDURE — 97140 MANUAL THERAPY 1/> REGIONS: CPT | Mod: PO

## 2023-03-27 NOTE — PROGRESS NOTES
Pelvic Health Physical Therapy   Progress and Daily Note     Name: Nguyen Dimas; [id number]  Visit Date: 3/27/2023; 10/3 of 20  Auth. Exp.: 12/29/2023 Progress Note: 5/15/2023   POC Exp.: 7/17/2023 1 Cx/ - NS   Eval Date: 3/17/2023 Precautions: Standard   FOTO#: -/- (-)    Physician: Sasha Wood, *  Physician Orders: PT Eval and Treat  Therapy Diagnosis:   Encounter Diagnoses   Name Primary?    Pelvic pain Yes    Thoracic spine pain     Muscle spasm     Myalgia     Lower abdominal pain      Precautions: Standard    Time In/Out: 1645/1755  Total Billable Time: 75 minutes    Subjective   Long Term Goals: 16 weeks   met   Pt will be able to sit at least 75' or during a movie without reproduction of sx.  met   Pt will be able to report 50% increase in productivity of coughing.  75%  Pt to report being able to complete a full day at work without significant increase in pain to demonstrate a return towards prior level of function.  50%  Pt to report no pain with palpation of abdominal wall due to improvement in soft tissue mobility from moderate to minimal restrictions.    25%  Pt to report feeling confident in ability to assume appropriate mechanics during work 80% of the time.   25%  Pt to be able to shower and dress without reproduction of sx at least 85% of time.    Pt reports:   Had some increased pain in the past week or so after returning to work following spring break  - also had ankle sprain 7 weeks ago and is returning to plof following that  - some increased hypogastric and inguinal pain on left with menstrual cycle due to endometriomas @ovary and sigmoid colon; does sometimes have stool with brb with this   She was compliant with home exercise program, fascial massage  Functional change/Response to previous treatment: improved awareness, motor control; decreased pain    Today's Treatment: 70 minutes   Pt verbally consents to today's tx. Signed consent on file.     Intervention 3/27/2023  3/1/2023   Thera Act.  Education Provided:  anatomy/physiology of pelvic floor, toileting mechanics/and exercise/activity modification visceral mobilization of > X sigmoid, left lower quadrant/ovarian motility  X sigmoid    Bowel/stool consistency nutrition  X    modifications for sitting at work  - pillow support, cushions, serola trial   X serola - improved comfort    to improve pfm and body mechanics during functional mobility and actvities of daily living and simulate physical requirement of exercise, work for 8 minutes  15'' minutes     Neuro   Education Provided:  posture/body mechanics, isometric abdominal exercises, and balance/proprioception   TrA coordination  X    Fascial release to abdominopelvic > X hypogastric/inguinal n dist. X left hypogastric, fascial release to femoral n, obturator n. and membrane    Obturator externus release  X    sls +hip ext; lunge +hop; lunge+hip internal rotation  lunge +ball toss/catch bilaterally   X +verbal/tactile cueing, facilitation for glute and hip stab. bilaterally (left>right )    Fascial release to abdominopelvic > X genito/femoral/LFC n  - umbulical approx with suprapubic + apt/ppt      Fascial release to adductor/pubic area  X obturator membrane     improve quality of muscle recruitment, motor control, sequencing,coordination, posture, proprioception  sensory re-education for optimal tissue response of tissues to stimuli during actvities of daily living for   23 minutes  30' minutes     Therapeutic Ex.  strength and endurance   Lunge +band- 10x; +hip internal rotation 10x bilaterally  X review + hip airplane X     hip internal rotation 90/90 X review 90/90 X 10x    for  8 minutes  15 minutes    Manual   Education:  Soft tissue Mobilization Soft tissue Mobilization X  sup/inf. gluteal n. and glute max/med/min/piriformis     Scar mobility to umbilicus inf, asis bi X            to increase pain free  soft tissue and joint range of motion  for  23 minutes     Total  Charges  1 TA, 2 NE, 1 TE, 2 MN 1 ta, 2 ne, 1 te      Home Exercises Provided: yes.  Exercises were reviewed and Nguyen Dimas was able to demonstrate them prior to the end of the session.         Education provided:   - anatomy/physiology of pelvic floor, posture/body mechanices and behavior modifications  Discussed progression of plan of care with patient; educated pt in activity modification; reviewed HEP with pt. Pt demonstrated and verbalized understanding of all instruction and was provided with a handout of HEP (see Patient Instructions).    Home Exercises Provided: Patient instructed to cont prior HEP.  Exercises were reviewed and Nguyen was able to demonstrate them prior to the end of the session.  Nguyen demonstrated good  understanding of the education provided.   See EMR under Patient Instructions for exercises provided  prior visit .    Assessment     Nguyen did great after last visit with increased hip range of motion and decreased discomfort following workouts. She has also been doing more mobilization to abdominal wall, thoracic spine, and chest with decreased pain to those areas. This visit has not started menstrual cycle or had any symptoms yet. Fascial release to bilateral lower quadrants for ovarian mobility, sigmoid colon, and releases to abdominal peripherals of lumbar plexus with increased range of motion to area. Additionally increased mobility to lower abdominal wall quadrants with decreased restriction with sup. mobilization from asis and suprapubic region on left>right. Patient also with some tender to palpation at sup. gluteal n dist. bilaterally relieved with fascia release to nerve.    Nguyen demonstrated and verbalized  good  understanding of instruction and education provided for abdominal/hypogastric fascial release and understanding of all instruction and was not provided with a handout of HEP.  See EMR under Patient Instructions for exercises provided prior visit.  Pt prognosis is  Fair.   Pt will continue to benefit from skilled outpatient physical therapy to address the deficits listed in the problem list box on initial evaluation, provide pt/family education and to maximize pt's level of independence in the home, gym, and worl/community environment.   Pt's spiritual, cultural and educational needs considered and pt agreeable to plan of care and goals.     Anticipated barriers to physical therapy: scheduling availabilty    Plan   Next Visit: 4/27/2023 -  sacral plexus + rotational work with single limb;    Plan of Care Re-Certification: 1/4/2023 to 7/17/2023.    Outpatient Physical Therapy 1 time(s) every 2-4 week(s) as needed through 7/17/2023 to include the following interventions: Gait Training, Manual Therapy, Neuromuscular Re-ed, Patient Education, Therapeutic Activities, Therapeutic Exercise, and dry needling.     Gissell Garza, PT ,DPT        I CERTIFY THE NEED FOR THESE SERVICES FURNISHED UNDER THIS PLAN OF TREATMENT AND WHILE UNDER MY CARE   Physician's comments:     Physician's Signature: ___________________________________________________

## 2023-03-28 NOTE — PLAN OF CARE
Pelvic Health Physical Therapy   Progress and Daily Note     Name: Nguyen Dimas; [id number]  Visit Date: 3/27/2023; 10/3 of 20  Auth. Exp.: 12/29/2023 Progress Note: 5/15/2023   POC Exp.: 7/17/2023 1 Cx/ - NS   Eval Date: 3/17/2023 Precautions: Standard   FOTO#: -/- (-)    Physician: Sasha Wood, *  Physician Orders: PT Eval and Treat  Therapy Diagnosis:   Encounter Diagnoses   Name Primary?    Pelvic pain Yes    Thoracic spine pain     Muscle spasm     Myalgia     Lower abdominal pain      Precautions: Standard    Time In/Out: 1645/1755  Total Billable Time: 75 minutes    Subjective   Long Term Goals: 16 weeks   met   Pt will be able to sit at least 75' or during a movie without reproduction of sx.  met   Pt will be able to report 50% increase in productivity of coughing.  75%  Pt to report being able to complete a full day at work without significant increase in pain to demonstrate a return towards prior level of function.  50%  Pt to report no pain with palpation of abdominal wall due to improvement in soft tissue mobility from moderate to minimal restrictions.    25%  Pt to report feeling confident in ability to assume appropriate mechanics during work 80% of the time.   25%  Pt to be able to shower and dress without reproduction of sx at least 85% of time.    Pt reports:   Had some increased pain in the past week or so after returning to work following spring break  - also had ankle sprain 7 weeks ago and is returning to plof following that  - some increased hypogastric and inguinal pain on left with menstrual cycle due to endometriomas @ovary and sigmoid colon; does sometimes have stool with brb with this   She was compliant with home exercise program, fascial massage  Functional change/Response to previous treatment: improved awareness, motor control; decreased pain    Today's Treatment: 70 minutes   Pt verbally consents to today's tx. Signed consent on file.     Intervention 3/27/2023  3/1/2023   Thera Act.  Education Provided:  anatomy/physiology of pelvic floor, toileting mechanics/and exercise/activity modification visceral mobilization of > X sigmoid, left lower quadrant/ovarian motility  X sigmoid    Bowel/stool consistency nutrition  X    modifications for sitting at work  - pillow support, cushions, serola trial   X serola - improved comfort    to improve pfm and body mechanics during functional mobility and actvities of daily living and simulate physical requirement of exercise, work for 8 minutes  15'' minutes     Neuro   Education Provided:  posture/body mechanics, isometric abdominal exercises, and balance/proprioception   TrA coordination  X    Fascial release to abdominopelvic > X hypogastric/inguinal n dist. X left hypogastric, fascial release to femoral n, obturator n. and membrane    Obturator externus release  X    sls +hip ext; lunge +hop; lunge+hip internal rotation  lunge +ball toss/catch bilaterally   X +verbal/tactile cueing, facilitation for glute and hip stab. bilaterally (left>right )    Fascial release to abdominopelvic > X genito/femoral/LFC n  - umbulical approx with suprapubic + apt/ppt      Fascial release to adductor/pubic area  X obturator membrane     improve quality of muscle recruitment, motor control, sequencing,coordination, posture, proprioception  sensory re-education for optimal tissue response of tissues to stimuli during actvities of daily living for   23 minutes  30' minutes     Therapeutic Ex.  strength and endurance   Lunge +band- 10x; +hip internal rotation 10x bilaterally  X review + hip airplane X     hip internal rotation 90/90 X review 90/90 X 10x    for  8 minutes  15 minutes    Manual   Education:  Soft tissue Mobilization Soft tissue Mobilization X  sup/inf. gluteal n. and glute max/med/min/piriformis     Scar mobility to umbilicus inf, asis bi X            to increase pain free  soft tissue and joint range of motion  for  23 minutes     Total  Charges  1 TA, 2 NE, 1 TE, 2 MN 1 ta, 2 ne, 1 te      Home Exercises Provided: yes.  Exercises were reviewed and Nguyen Dimas was able to demonstrate them prior to the end of the session.         Education provided:   - anatomy/physiology of pelvic floor, posture/body mechanices and behavior modifications  Discussed progression of plan of care with patient; educated pt in activity modification; reviewed HEP with pt. Pt demonstrated and verbalized understanding of all instruction and was provided with a handout of HEP (see Patient Instructions).    Home Exercises Provided: Patient instructed to cont prior HEP.  Exercises were reviewed and Nguyen was able to demonstrate them prior to the end of the session.  Nguyen demonstrated good  understanding of the education provided.   See EMR under Patient Instructions for exercises provided prior visit.    Assessment     Nguyen did great after last visit with increased hip range of motion and decreased discomfort following workouts. She has also been doing more mobilization to abdominal wall, thoracic spine, and chest with decreased pain to those areas. This visit has not started menstrual cycle or had any symptoms yet. Fascial release to bilateral lower quadrants for ovarian mobility, sigmoid colon, and releases to abdominal peripherals of lumbar plexus with increased range of motion to area. Additionally increased mobility to lower abdominal wall quadrants with decreased restriction with sup. mobilization from asis and suprapubic region on left>right. Patient also with some tender to palpation at sup. gluteal n dist. bilaterally relieved with fascia release to nerve.    Nguyen demonstrated and verbalized  good  understanding of instruction and education provided for abdominal/hypogastric fascial release and understanding of all instruction and was not provided with a handout of HEP.  See EMR under Patient Instructions for exercises provided prior visit.  Pt prognosis is  Fair.   Pt will continue to benefit from skilled outpatient physical therapy to address the deficits listed in the problem list box on initial evaluation, provide pt/family education and to maximize pt's level of independence in the home, gym, and worl/community environment.   Pt's spiritual, cultural and educational needs considered and pt agreeable to plan of care and goals.     Anticipated barriers to physical therapy: scheduling availabilty    Plan   Next Visit: 4/27/2023 -  sacral plexus + rotational work with single limb;    Plan of Care Re-Certification: 1/4/2023 to 7/17/2023.    Outpatient Physical Therapy 1 time(s) every 2-4 week(s) as needed through 7/17/2023 to include the following interventions: Gait Training, Manual Therapy, Neuromuscular Re-ed, Patient Education, Therapeutic Activities, Therapeutic Exercise, and dry needling.     Gissell Garza, PT ,DPT        I CERTIFY THE NEED FOR THESE SERVICES FURNISHED UNDER THIS PLAN OF TREATMENT AND WHILE UNDER MY CARE   Physician's comments:     Physician's Signature: ___________________________________________________

## 2023-04-21 ENCOUNTER — OFFICE VISIT (OUTPATIENT)
Dept: URGENT CARE | Facility: CLINIC | Age: 48
End: 2023-04-21
Payer: COMMERCIAL

## 2023-04-21 VITALS
HEIGHT: 61 IN | OXYGEN SATURATION: 98 % | BODY MASS INDEX: 23.03 KG/M2 | TEMPERATURE: 99 F | SYSTOLIC BLOOD PRESSURE: 123 MMHG | HEART RATE: 71 BPM | DIASTOLIC BLOOD PRESSURE: 69 MMHG | WEIGHT: 122 LBS

## 2023-04-21 DIAGNOSIS — S51.851A: ICD-10-CM

## 2023-04-21 DIAGNOSIS — Z02.6 ENCOUNTER RELATED TO WORKER'S COMPENSATION CLAIM: Primary | ICD-10-CM

## 2023-04-21 PROCEDURE — 99202 OFFICE O/P NEW SF 15 MIN: CPT | Mod: S$GLB,,,

## 2023-04-21 PROCEDURE — 99202 PR OFFICE/OUTPT VISIT, NEW, LEVL II, 15-29 MIN: ICD-10-PCS | Mod: S$GLB,,,

## 2023-04-21 NOTE — PROGRESS NOTES
Subjective:      Patient ID: Nguyen Dimas is a 47 y.o. female.    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 The patient reports that is the only injury and she washed it several times since it happened. There was some blood and redness. The arm is a little stiff and she is RHD. The patient's last tetanus shot was in 2018. [facility]     Constitution: Positive for activity change.   HENT: Negative.     Neck: neck negative.   Cardiovascular: Negative.    Eyes: Negative.    Respiratory: Negative.     Gastrointestinal: Negative.    Endocrine: negative.   Genitourinary: Negative.    Musculoskeletal:  Positive for muscle ache.   Skin:  Positive for color change, wound, erythema and bruising.   Neurological: Negative.    Objective:     Physical Exam  Vitals and nursing note reviewed.   Constitutional:       General: She is not in acute distress.  HENT:      Head: Normocephalic.   Eyes:      General: Lids are normal.      Conjunctiva/sclera: Conjunctivae normal.   Musculoskeletal:      Right elbow: No swelling or deformity. Normal range of motion.      Right forearm: Swelling present. No lacerations or tenderness.      Right wrist: No swelling or deformity. Normal range of motion.      Right hand: No swelling or deformity. Normal range of motion.        Arms:       Cervical back: No pain with movement.      Comments: There is approximately 2 centimeter area of induration with erythema noted to the dorsal mid right forearm.  Mild discomfort but not pain to same area on palpation  and during range of motion assessment of right wrist.  Within the area of induration, there is approximately 2 millimeter skin break.  There is no active bleeding and wound edges well approximated.  Full range of motion of the right elbow, right wrist, and right fingers. N/V intact   Skin:     General: Skin is warm.      Capillary Refill: Capillary refill takes less than 2 seconds.      Findings: Erythema present.   Neurological:      General: No focal deficit present.      Mental Status: She is alert and oriented to person, place, and time.      Gait: Gait is intact.   Psychiatric:         Attention and Perception: Attention normal.         Mood and Affect: Mood and affect normal.         Speech: Speech normal.         Behavior: Behavior normal. Behavior is cooperative.      Assessment:      1. Encounter related to worker's compensation claim    2. Bite wound of forearm, right, initial encounter      Plan:     The bite incident occurred approximately 10 a.m. today.  Since that time she is cleaned the wound area approximally 3 times and the skin break is very subtle and healing.  Tetanus is up to date.  She is aware to monitor for any signs or symptoms of infection and to follow-up should any concerns arise.  Otherwise she may use cold packs and or over-the-counter anti-inflammatory for any swelling or discomfort.  She is to follow up on an as-needed basis.         Restrictions: Regular Duty, Discharged from Occupational Health  Follow up if symptoms worsen or fail to improve.

## 2023-04-21 NOTE — LETTER
[facility] Occupational [facility]  [address] [address]  Phone: [phone]  Fax: [phone]  [facility] [employer]: [phone]    Pt Name: Nguyen Dimas  Injury Date: 04/21/2023   Employee ID:  Date of First Treatment: 04/21/2023   Company: [employer]      Appointment Time: Arrived: 12:01 PM    Provider: Deejay Kennedy, DO Time Out: 2:00 PM      Office Treatment:   1. Encounter related to worker's compensation claim    2. Bite wound of forearm, right, initial encounter               Restrictions: Regular Duty, Discharged from Occupational Health     Return As needed. MARTÍN

## 2023-04-27 ENCOUNTER — CLINICAL SUPPORT (OUTPATIENT)
Dept: REHABILITATION | Facility: HOSPITAL | Age: 48
End: 2023-04-27
Payer: COMMERCIAL

## 2023-04-27 DIAGNOSIS — R10.30 LOWER ABDOMINAL PAIN: ICD-10-CM

## 2023-04-27 DIAGNOSIS — R10.2 PELVIC PAIN: Primary | ICD-10-CM

## 2023-04-27 DIAGNOSIS — M54.6 THORACIC SPINE PAIN: ICD-10-CM

## 2023-04-27 DIAGNOSIS — M79.10 MYALGIA: ICD-10-CM

## 2023-04-27 DIAGNOSIS — M62.838 MUSCLE SPASM: ICD-10-CM

## 2023-04-27 PROCEDURE — 97530 THERAPEUTIC ACTIVITIES: CPT | Mod: PO

## 2023-04-27 PROCEDURE — 97112 NEUROMUSCULAR REEDUCATION: CPT | Mod: PO

## 2023-04-27 PROCEDURE — 97110 THERAPEUTIC EXERCISES: CPT | Mod: PO

## 2023-04-27 NOTE — PROGRESS NOTES
Pelvic Health Physical Therapy   Progress and Daily Note     Name: Nguyen Dimas; [id number]  Visit Date: 4/27/2023; 11/4 of 20  Auth. Exp.: 12/29/2023 Progress Note: 5/15/2023   POC Exp.: 7/17/2023 1 Cx/ - NS   Eval Date: 3/17/2023 Precautions: Standard   FOTO#: -/- (-)    Physician: Sasha Wood, *  Physician Orders: PT Eval and Treat  Therapy Diagnosis:   Encounter Diagnoses   Name Primary?    Pelvic pain Yes    Thoracic spine pain     Muscle spasm     Myalgia     Lower abdominal pain      Precautions: Standard    Time In/Out: 1535/1620  Total Billable Time: 45 minutes    Subjective   Long Term Goals: 16 weeks   met   Pt will be able to sit at least 75' or during a movie without reproduction of sx.  met   Pt will be able to report 50% increase in productivity of coughing.  75%  Pt to report being able to complete a full day at work without significant increase in pain to demonstrate a return towards prior level of function.  50%  Pt to report no pain with palpation of abdominal wall due to improvement in soft tissue mobility from moderate to minimal restrictions.    25%  Pt to report feeling confident in ability to assume appropriate mechanics during work 80% of the time.   25%  Pt to be able to shower and dress without reproduction of sx at least 85% of time.    Pt reports: has been working on rotational and single leg activity at home; not really having any endo pains ,but noticed increased upper respiratory complaints and fatigue prior to cycle; monitoring frequency of urination and hydration status   She was compliant with home exercise program, fascial massage  Functional change/Response to previous treatment: improved awareness, motor control; decreased pain    Today's Treatment: 45 minutes   Pt verbally consents to today's tx. Signed consent on file.     Intervention 4/27/2023 3/27/2023 3/1/2023   Thera Act.  Education Provided:  anatomy/physiology of pelvic floor, toileting  mechanics/and exercise/activity modification visceral mobilization of >  X sigmoid, left lower quadrant/ovarian motility  X sigmoid    HEP - review and mod X self release for glute med, min; continue single leg work      Bowel/stool consistency nutrition   X    modifications for sitting at work  - pillow support, cushions, serola trial    X serola - improved comfort    to improve pfm and body mechanics during functional mobility and actvities of daily living and simulate physical requirement of exercise, work for 11' 8 minutes  15'' minutes     Neuro   Education Provided:  posture/body mechanics, isometric abdominal exercises, and balance/proprioception   TrA coordination   X    Fascial release to abdominopelvic > X left lower quadrant  X hypogastric/inguinal n dist. X left hypogastric, fascial release to femoral n, obturator n. and membrane    Obturator externus release X   X    sls +hip ext; lunge +hop; lunge+hip internal rotation  lunge +ball toss/catch bilaterally    X +verbal/tactile cueing, facilitation for glute and hip stab. bilaterally (left>right )    Fascial release to abdominopelvic > X inguinal n dist on left  X genito/femoral/LFC n  - umbulical approx with suprapubic + apt/ppt      Fascial release to adductor/pubic area  X obturator internus m, ischiorectal fossa, external anal sphincter   + ss/st ligament release bilateral - sup. gluteal n. X obturator membrane     improve quality of muscle recruitment, motor control, sequencing,coordination, posture, proprioception  sensory re-education for optimal tissue response of tissues to stimuli during actvities of daily living for  23'  23 minutes  30' minutes     Therapeutic Ex.  strength and endurance   Lunge +band- 10x; +hip internal rotation 10x bilaterally   X review + hip airplane X     hip internal rotation 90/90  X review 90/90 X 10x    for   8 minutes  15 minutes    Manual   Education:  Soft tissue Mobilization Soft tissue Mobilization X glute med,  min  X  sup/inf. gluteal n. and glute max/med/min/piriformis     Scar mobility to umbilicus inf, asis bi X X             to increase pain free  soft tissue and joint range of motion  for  11' 23 minutes     Total Charges  2 NE, 1 MN, 1 TA 1 TA, 2 NE, 1 TE, 2 MN 1 ta, 2 ne, 1 te      Home Exercises Provided: yes.  Exercises were reviewed and Nguyen Dimas was able to demonstrate them prior to the end of the session.         Education provided:   - anatomy/physiology of pelvic floor, posture/body mechanices and behavior modifications  Discussed progression of plan of care with patient; educated pt in activity modification; reviewed HEP with pt. Pt demonstrated and verbalized understanding of all instruction and was provided with a handout of HEP (see Patient Instructions).    Home Exercises Provided: Patient instructed to cont prior HEP.  Exercises were reviewed and Nguyen was able to demonstrate them prior to the end of the session.  Nguyen demonstrated good  understanding of the education provided.   See EMR under Patient Instructions for exercises provided  prior visit .    Assessment     Nguyen is doing well with exercise, hep, and endo pain with exception of left lateral hip and glute pain. Pressure helped between visits. This visit emphasis placed on left lower quadrant and left lateral hip, as well as sacral fascial mobility. Left obturator internus and post. pelvic floor muscles with limited mobility and tenderness as well as sup. gluteal n on left with increased tension and thickness along gluteal insertion to iliac crest on left. Right painless. Patient with positive response to tx with decreased tension and discomfort with stnading and to left hip and left lower quadrant of abdomen.  Nguyen demonstrated and verbalized  good  understanding of instruction and education provided for abdominal/hypogastric fascial release and understanding of all instruction and was not provided with a handout of HEP.  See EMR  under Patient Instructions for exercises provided prior visit.  Pt prognosis is Fair.   Pt will continue to benefit from skilled outpatient physical therapy to address the deficits listed in the problem list box on initial evaluation, provide pt/family education and to maximize pt's level of independence in the home, gym, and worl/community environment.   Pt's spiritual, cultural and educational needs considered and pt agreeable to plan of care and goals.     Anticipated barriers to physical therapy: scheduling availabilty    Plan   Next Visit: 4/27/2023 -  left lateral hip and glute; sacral plexus + rotational work with single limb;    Plan of Care Re-Certification: 4/17/2023 to 7/17/2023.    Outpatient Physical Therapy 1 time(s) every 2-4 week(s) as needed through 7/17/2023 to include the following interventions: Gait Training, Manual Therapy, Neuromuscular Re-ed, Patient Education, Therapeutic Activities, Therapeutic Exercise, and dry needling.     Gissell Garza, PT ,DPT        I CERTIFY THE NEED FOR THESE SERVICES FURNISHED UNDER THIS PLAN OF TREATMENT AND WHILE UNDER MY CARE   Physician's comments:     Physician's Signature: ___________________________________________________

## 2023-04-27 NOTE — PLAN OF CARE
Pelvic Health Physical Therapy   Progress and Daily Note     Name: Nguyen Dimas; [id number]  Visit Date: 4/27/2023; 11/4 of 20  Auth. Exp.: 12/29/2023 Progress Note: 5/15/2023   POC Exp.: 7/17/2023 1 Cx/ - NS   Eval Date: 3/17/2023 Precautions: Standard   FOTO#: -/- (-)    Physician: Sasha Wood, *  Physician Orders: PT Eval and Treat  Therapy Diagnosis:   Encounter Diagnoses   Name Primary?    Pelvic pain Yes    Thoracic spine pain     Muscle spasm     Myalgia     Lower abdominal pain      Precautions: Standard    Time In/Out: 1535/1620  Total Billable Time: 45 minutes    Subjective   Long Term Goals: 16 weeks   met   Pt will be able to sit at least 75' or during a movie without reproduction of sx.  met   Pt will be able to report 50% increase in productivity of coughing.  75%  Pt to report being able to complete a full day at work without significant increase in pain to demonstrate a return towards prior level of function.  50%  Pt to report no pain with palpation of abdominal wall due to improvement in soft tissue mobility from moderate to minimal restrictions.    25%  Pt to report feeling confident in ability to assume appropriate mechanics during work 80% of the time.   25%  Pt to be able to shower and dress without reproduction of sx at least 85% of time.    Pt reports: has been working on rotational and single leg activity at home; not really having any endo pains ,but noticed increased upper respiratory complaints and fatigue prior to cycle; monitoring frequency of urination and hydration status   She was compliant with home exercise program, fascial massage  Functional change/Response to previous treatment: improved awareness, motor control; decreased pain    Today's Treatment: 45 minutes   Pt verbally consents to today's tx. Signed consent on file.     Intervention 4/27/2023 3/27/2023 3/1/2023   Thera Act.  Education Provided:  anatomy/physiology of pelvic floor, toileting mechanics/and  exercise/activity modification visceral mobilization of >  X sigmoid, left lower quadrant/ovarian motility  X sigmoid    HEP - review and mod X self release for glute med, min; continue single leg work      Bowel/stool consistency nutrition   X    modifications for sitting at work  - pillow support, cushions, serola trial    X serola - improved comfort    to improve pfm and body mechanics during functional mobility and actvities of daily living and simulate physical requirement of exercise, work for 11' 8 minutes  15'' minutes     Neuro   Education Provided:  posture/body mechanics, isometric abdominal exercises, and balance/proprioception   TrA coordination   X    Fascial release to abdominopelvic > X left lower quadrant  X hypogastric/inguinal n dist. X left hypogastric, fascial release to femoral n, obturator n. and membrane    Obturator externus release X   X    sls +hip ext; lunge +hop; lunge+hip internal rotation  lunge +ball toss/catch bilaterally    X +verbal/tactile cueing, facilitation for glute and hip stab. bilaterally (left>right )    Fascial release to abdominopelvic > X inguinal n dist on left  X genito/femoral/LFC n  - umbulical approx with suprapubic + apt/ppt      Fascial release to adductor/pubic area  X obturator internus m, ischiorectal fossa, external anal sphincter   + ss/st ligament release bilateral - sup. gluteal n. X obturator membrane     improve quality of muscle recruitment, motor control, sequencing,coordination, posture, proprioception  sensory re-education for optimal tissue response of tissues to stimuli during actvities of daily living for  23'  23 minutes  30' minutes     Therapeutic Ex.  strength and endurance   Lunge +band- 10x; +hip internal rotation 10x bilaterally   X review + hip airplane X     hip internal rotation 90/90  X review 90/90 X 10x    for   8 minutes  15 minutes    Manual   Education:  Soft tissue Mobilization Soft tissue Mobilization X glute med, min  X   sup/inf. gluteal n. and glute max/med/min/piriformis     Scar mobility to umbilicus inf, asis bi X X             to increase pain free  soft tissue and joint range of motion  for  11' 23 minutes     Total Charges  2 NE, 1 MN, 1 TA 1 TA, 2 NE, 1 TE, 2 MN 1 ta, 2 ne, 1 te      Home Exercises Provided: yes.  Exercises were reviewed and Nguyen Dimas was able to demonstrate them prior to the end of the session.         Education provided:   - anatomy/physiology of pelvic floor, posture/body mechanices and behavior modifications  Discussed progression of plan of care with patient; educated pt in activity modification; reviewed HEP with pt. Pt demonstrated and verbalized understanding of all instruction and was provided with a handout of HEP (see Patient Instructions).    Home Exercises Provided: Patient instructed to cont prior HEP.  Exercises were reviewed and Nguyen was able to demonstrate them prior to the end of the session.  Nguyen demonstrated good  understanding of the education provided.   See EMR under Patient Instructions for exercises provided prior visit.    Assessment     Nguyen is doing well with exercise, hep, and endo pain with exception of left lateral hip and glute pain. Pressure helped between visits. This visit emphasis placed on left lower quadrant and left lateral hip, as well as sacral fascial mobility. Left obturator internus and post. pelvic floor muscles with limited mobility and tenderness as well as sup. gluteal n on left with increased tension and thickness along gluteal insertion to iliac crest on left. Right painless. Patient with positive response to tx with decreased tension and discomfort with stnading and to left hip and left lower quadrant of abdomen.  Nguyen demonstrated and verbalized  good  understanding of instruction and education provided for abdominal/hypogastric fascial release and understanding of all instruction and was not provided with a handout of HEP.  See EMR under  Patient Instructions for exercises provided prior visit.  Pt prognosis is Fair.   Pt will continue to benefit from skilled outpatient physical therapy to address the deficits listed in the problem list box on initial evaluation, provide pt/family education and to maximize pt's level of independence in the home, gym, and worl/community environment.   Pt's spiritual, cultural and educational needs considered and pt agreeable to plan of care and goals.     Anticipated barriers to physical therapy: scheduling availabilty    Plan   Next Visit: 4/27/2023 -  left lateral hip and glute; sacral plexus + rotational work with single limb;    Plan of Care Re-Certification: 4/17/2023 to 7/17/2023.    Outpatient Physical Therapy 1 time(s) every 2-4 week(s) as needed through 7/17/2023 to include the following interventions: Gait Training, Manual Therapy, Neuromuscular Re-ed, Patient Education, Therapeutic Activities, Therapeutic Exercise, and dry needling.     Gissell Garza, PT ,DPT        I CERTIFY THE NEED FOR THESE SERVICES FURNISHED UNDER THIS PLAN OF TREATMENT AND WHILE UNDER MY CARE   Physician's comments:     Physician's Signature: ___________________________________________________

## 2023-05-12 ENCOUNTER — OFFICE VISIT (OUTPATIENT)
Dept: OBSTETRICS AND GYNECOLOGY | Facility: CLINIC | Age: 48
End: 2023-05-12
Attending: STUDENT IN AN ORGANIZED HEALTH CARE EDUCATION/TRAINING PROGRAM
Payer: COMMERCIAL

## 2023-05-12 VITALS — WEIGHT: 119.06 LBS | HEIGHT: 61 IN | BODY MASS INDEX: 22.48 KG/M2

## 2023-05-12 DIAGNOSIS — N80.9 ENDOMETRIOSIS: ICD-10-CM

## 2023-05-12 DIAGNOSIS — Z01.419 ENCOUNTER FOR GYNECOLOGICAL EXAMINATION: Primary | ICD-10-CM

## 2023-05-12 DIAGNOSIS — N90.89 VULVAR LESION: ICD-10-CM

## 2023-05-12 DIAGNOSIS — Z12.31 BREAST CANCER SCREENING BY MAMMOGRAM: ICD-10-CM

## 2023-05-12 DIAGNOSIS — K92.1 BLOOD IN STOOL: ICD-10-CM

## 2023-05-12 DIAGNOSIS — Z01.419 WELL WOMAN EXAM: ICD-10-CM

## 2023-05-12 PROCEDURE — 56605 BIOPSY OF VULVA/PERINEUM: CPT | Mod: S$GLB,,, | Performed by: STUDENT IN AN ORGANIZED HEALTH CARE EDUCATION/TRAINING PROGRAM

## 2023-05-12 PROCEDURE — 99999 PR PBB SHADOW E&M-EST. PATIENT-LVL III: ICD-10-PCS | Mod: PBBFAC,,, | Performed by: STUDENT IN AN ORGANIZED HEALTH CARE EDUCATION/TRAINING PROGRAM

## 2023-05-12 PROCEDURE — 88342 IMHCHEM/IMCYTCHM 1ST ANTB: CPT | Performed by: PATHOLOGY

## 2023-05-12 PROCEDURE — 1159F PR MEDICATION LIST DOCUMENTED IN MEDICAL RECORD: ICD-10-PCS | Mod: CPTII,S$GLB,, | Performed by: STUDENT IN AN ORGANIZED HEALTH CARE EDUCATION/TRAINING PROGRAM

## 2023-05-12 PROCEDURE — 88364 INSITU HYBRIDIZATION (FISH): CPT | Performed by: PATHOLOGY

## 2023-05-12 PROCEDURE — 88341 PR IHC OR ICC EACH ADD'L SINGLE ANTIBODY  STAINPR: ICD-10-PCS | Mod: 26,,, | Performed by: PATHOLOGY

## 2023-05-12 PROCEDURE — 88341 IMHCHEM/IMCYTCHM EA ADD ANTB: CPT | Mod: 26,,, | Performed by: PATHOLOGY

## 2023-05-12 PROCEDURE — 3008F BODY MASS INDEX DOCD: CPT | Mod: CPTII,S$GLB,, | Performed by: STUDENT IN AN ORGANIZED HEALTH CARE EDUCATION/TRAINING PROGRAM

## 2023-05-12 PROCEDURE — 99999 PR PBB SHADOW E&M-EST. PATIENT-LVL III: CPT | Mod: PBBFAC,,, | Performed by: STUDENT IN AN ORGANIZED HEALTH CARE EDUCATION/TRAINING PROGRAM

## 2023-05-12 PROCEDURE — 88305 TISSUE EXAM BY PATHOLOGIST: ICD-10-PCS | Mod: 26,,, | Performed by: PATHOLOGY

## 2023-05-12 PROCEDURE — 88305 TISSUE EXAM BY PATHOLOGIST: CPT | Mod: 26,,, | Performed by: PATHOLOGY

## 2023-05-12 PROCEDURE — 3008F PR BODY MASS INDEX (BMI) DOCUMENTED: ICD-10-PCS | Mod: CPTII,S$GLB,, | Performed by: STUDENT IN AN ORGANIZED HEALTH CARE EDUCATION/TRAINING PROGRAM

## 2023-05-12 PROCEDURE — 99396 PR PREVENTIVE VISIT,EST,40-64: ICD-10-PCS | Mod: 25,S$GLB,, | Performed by: STUDENT IN AN ORGANIZED HEALTH CARE EDUCATION/TRAINING PROGRAM

## 2023-05-12 PROCEDURE — 56605 BIOPSY (GYNECOLOGICAL): ICD-10-PCS | Mod: S$GLB,,, | Performed by: STUDENT IN AN ORGANIZED HEALTH CARE EDUCATION/TRAINING PROGRAM

## 2023-05-12 PROCEDURE — 88305 TISSUE EXAM BY PATHOLOGIST: CPT | Performed by: PATHOLOGY

## 2023-05-12 PROCEDURE — 1159F MED LIST DOCD IN RCRD: CPT | Mod: CPTII,S$GLB,, | Performed by: STUDENT IN AN ORGANIZED HEALTH CARE EDUCATION/TRAINING PROGRAM

## 2023-05-12 PROCEDURE — 88365 INSITU HYBRIDIZATION (FISH): CPT | Performed by: PATHOLOGY

## 2023-05-12 PROCEDURE — 88342 IMHCHEM/IMCYTCHM 1ST ANTB: CPT | Mod: 26,,, | Performed by: PATHOLOGY

## 2023-05-12 PROCEDURE — 88342 CHG IMMUNOCYTOCHEMISTRY: ICD-10-PCS | Mod: 26,,, | Performed by: PATHOLOGY

## 2023-05-12 PROCEDURE — 99396 PREV VISIT EST AGE 40-64: CPT | Mod: 25,S$GLB,, | Performed by: STUDENT IN AN ORGANIZED HEALTH CARE EDUCATION/TRAINING PROGRAM

## 2023-05-12 PROCEDURE — 88341 IMHCHEM/IMCYTCHM EA ADD ANTB: CPT | Performed by: PATHOLOGY

## 2023-05-12 NOTE — PROCEDURES
"Nguyen Dimas is a 47 y.o. female patient.    Weight: 54 kg (119 lb 0.8 oz) (05/12/23 1529)  Height: 5' 1" (154.9 cm) (05/12/23 1529)       Biopsy (Gynecological)    Date/Time: 5/12/2023 3:30 PM  Performed by: Sasha Wood MD  Authorized by: Sasha Wood MD     Consent Done?:  Yes (Written)   Patient was prepped and draped in the normal sterile fashion.  Local anesthesia used?: Yes    Local anesthetic:  Lidocaine 1% without epinephrine    Biopsy Location:  Vulva  Vulva:     # of lesions:  1   Patient tolerated the procedure well with no immediate complications.     Silver nitrate applied.  Hemostasis obtained.  Post procedure care reviewed and all questions answered.      5/12/2023    "

## 2023-05-12 NOTE — PROGRESS NOTES
Chief Complaint: Well Woman Exam     HPI:      Nguyen Dimas is a 47 y.o.  who presents today for well woman exam.  LMP: No LMP recorded (lmp unknown). Patient has had a hysterectomy.  Supracervical hysterectomy.  Reports a history of endometriosis with endometriomas and sigmoid implant.  Reports blood ins tool.  Sees acupuncture and pelvic floor pt.  EndometNo issues, problems, or complaints. Specifically, patient denies abnormal vaginal bleeding, discharge, pelvic pain, urinary problems, or changes in appetite. Ms. Dimas is currently sexually active with a single male partner.     Previous Pap:  NEM, HPV neg  Previous Mammogram: Needs  Most Recent Dexa: NA  Colonoscopy: NA    GYN Hx:  Menarche 16.  S/p hysterectomy in  with no cycles since then.  Possible abnormal pap smear years ago.     OB History          3    Para   3    Term   3            AB        Living   4         SAB        IAB        Ectopic        Multiple   1    Live Births   4               Past Medical History:   Diagnosis Date    Abnormal Pap smear of cervix      Past Surgical History:   Procedure Laterality Date    ABDOMINOPLASTY      AUGMENTATION OF BREAST Bilateral      SECTION      HYSTERECTOMY      INSERTION OF BREAST IMPLANT  2007    WISDOM TOOTH EXTRACTION       Social History     Socioeconomic History    Marital status:    Tobacco Use    Smoking status: Never    Smokeless tobacco: Never   Substance and Sexual Activity    Alcohol use: Yes     Comment: rarely    Drug use: Never    Sexual activity: Yes     Partners: Male     Birth control/protection: See Surgical Hx     Family History   Problem Relation Age of Onset    No Known Problems Father     No Known Problems Mother     Breast cancer Neg Hx     Colon cancer Neg Hx     Ovarian cancer Neg Hx      No current outpatient medications on file.    ROS:     GENERAL: Denies unintentional weight gain or weight loss. Feeling well overall.  "  SKIN: Denies rash or lesions.   HEENT: Denies headaches, or vision changes.   CARDIOVASCULAR: Denies palpitations or chest pain.   RESPIRATORY: Denies shortness of breath or dyspnea on exertion.  BREASTS: Denies pain, lumps, or nipple discharge.   ABDOMEN: Denies abdominal pain, constipation, diarrhea, nausea, vomiting, change in appetite.  URINARY: Denies frequency, dysuria, hematuria.  NEUROLOGIC: Denies syncope or weakness.   PSYCHIATRIC: Denies depression, anxiety or mood swings.    Physical Exam:      PHYSICAL EXAM:  Ht 5' 1" (1.549 m)   Wt 54 kg (119 lb 0.8 oz)   LMP  (LMP Unknown)   BMI 22.49 kg/m²   Body mass index is 22.49 kg/m².     APPEARANCE: Well nourished, well developed, in no acute distress.  PSYCH: Appropriate mood and affect.  SKIN: No acne or hirsutism.  NECK: Neck symmetric without masses or thyromegaly.  NODES: No inguinal, axillary, or supraclavicular lymph node enlargement.  BREASTS: Symmetrical, no skin changes or visible lesions.  No palpable masses or nipple discharge bilaterally.  ABDOMEN: Soft.  No tenderness or masses.    PELVIC: Normal external genitalia without lesions.  Small 4 mm lesion at R mons.  Normal hair distribution.  Adequate perineal body, normal urethral meatus.  Vagina moist and well rugated without lesions or discharge.  Cervix pink, without lesions, discharge or tenderness.  No significant cystocele or rectocele.  Bimanual exam shows uterus to be surgically absent.  Adnexa without masses or tenderness.    EXTREMITIES: No edema.  No tenderness to palpation.    Assessment/Plan:     47 y.o.     Encounter for gynecological examination    Breast cancer screening by mammogram          Counselin.  Annual exam performed today without difficulty.  Patient was counseled today on current ASCCP pap guidelines, the recommendation for yearly pelvic exams, healthy diet and exercise routines, annual mammograms.  GI referral for blood in stool.  Also discussed " treatment options for endometriosis.  R/b/a reviewed and possible side effects.  All questions answered.  Patient declines treatment at this time.  Pap smear utd.  Vulvar lesionbiopsied today, will follow up results. All questions answered.    2.  Follow up with PCP for other health maintenance.  3.  No follow-ups on file.      Use of the [employer] Patient Portal discussed and encouraged during today's visit.

## 2023-05-26 ENCOUNTER — HOSPITAL ENCOUNTER (OUTPATIENT)
Dept: RADIOLOGY | Facility: HOSPITAL | Age: 48
Discharge: HOME | End: 2023-05-26
Attending: STUDENT IN AN ORGANIZED HEALTH CARE EDUCATION/TRAINING PROGRAM
Payer: COMMERCIAL

## 2023-05-26 VITALS — WEIGHT: 119 LBS | HEIGHT: 61 IN | BODY MASS INDEX: 22.47 KG/M2

## 2023-05-26 DIAGNOSIS — Z12.31 BREAST CANCER SCREENING BY MAMMOGRAM: ICD-10-CM

## 2023-05-26 PROCEDURE — 77067 SCR MAMMO BI INCL CAD: CPT | Mod: TC

## 2023-05-26 PROCEDURE — 77067 MAMMO DIGITAL SCREENING BILAT WITH TOMO: ICD-10-PCS | Mod: 26,,, | Performed by: RADIOLOGY

## 2023-05-26 PROCEDURE — 77067 SCR MAMMO BI INCL CAD: CPT | Mod: 26,,, | Performed by: RADIOLOGY

## 2023-05-26 PROCEDURE — 77063 MAMMO DIGITAL SCREENING BILAT WITH TOMO: ICD-10-PCS | Mod: 26,,, | Performed by: RADIOLOGY

## 2023-05-26 PROCEDURE — 77063 BREAST TOMOSYNTHESIS BI: CPT | Mod: 26,,, | Performed by: RADIOLOGY

## 2023-05-31 ENCOUNTER — TELEPHONE (OUTPATIENT)
Dept: OBSTETRICS AND GYNECOLOGY | Facility: CLINIC | Age: 48
End: 2023-05-31
Payer: COMMERCIAL

## 2023-06-05 ENCOUNTER — CLINICAL SUPPORT (OUTPATIENT)
Dept: REHABILITATION | Facility: HOSPITAL | Age: 48
End: 2023-06-05
Payer: COMMERCIAL

## 2023-06-05 DIAGNOSIS — M79.10 MYALGIA: ICD-10-CM

## 2023-06-05 DIAGNOSIS — R10.2 PELVIC PAIN: Primary | ICD-10-CM

## 2023-06-05 DIAGNOSIS — M54.6 THORACIC SPINE PAIN: ICD-10-CM

## 2023-06-05 DIAGNOSIS — M62.838 MUSCLE SPASM: ICD-10-CM

## 2023-06-05 DIAGNOSIS — R10.30 LOWER ABDOMINAL PAIN: ICD-10-CM

## 2023-06-05 LAB
FINAL PATHOLOGIC DIAGNOSIS: NORMAL
GROSS: NORMAL
Lab: NORMAL
MICROSCOPIC EXAM: NORMAL

## 2023-06-05 PROCEDURE — 97530 THERAPEUTIC ACTIVITIES: CPT | Mod: PO

## 2023-06-05 PROCEDURE — 97112 NEUROMUSCULAR REEDUCATION: CPT | Mod: PO

## 2023-06-05 PROCEDURE — 97140 MANUAL THERAPY 1/> REGIONS: CPT | Mod: PO

## 2023-06-05 NOTE — PROGRESS NOTES
Pelvic Health Physical Therapy   Progress and Daily Note     Name: Nguyen Dimas; [id number]  Visit Date: 6/5/2023; 12/5of 20  Auth. Exp.: 12/29/2023 Progress Note: 7/5/2023   POC Exp.: 7/17/2023 1 Cx/ - NS   Eval Date: 3/17/2023 Precautions: Standard   FOTO#: -/- (-)    Physician: Sasha Wood, *  Physician Orders: PT Eval and Treat  Therapy Diagnosis:   Encounter Diagnoses   Name Primary?    Pelvic pain Yes    Thoracic spine pain     Muscle spasm     Myalgia     Lower abdominal pain      Precautions: Standard    Time In/Out: 0830/0930  Total Billable Time: 60 minutes    Subjective   Long Term Goals: 16 weeks   met   Pt will be able to sit at least 75' or during a movie without reproduction of sx.  met   Pt will be able to report 50% increase in productivity of coughing.  75%  Pt to report being able to complete a full day at work without significant increase in pain to demonstrate a return towards prior level of function.  50%  Pt to report no pain with palpation of abdominal wall due to improvement in soft tissue mobility from moderate to minimal restrictions.    25%  Pt to report feeling confident in ability to assume appropriate mechanics during work 80% of the time.   25%  Pt to be able to shower and dress without reproduction of sx at least 85% of time.    Pt reports: reports increased discomfort and tender to palpation at midline inferior to umbilicus, and feels harder and more bloated/swollen at suprapubic region; feels like something is in the way when bending forward and is usually associated with hormonal fluctuations   She was compliant with home exercise program, fascial massage  Functional change/Response to previous treatment: improved awareness, motor control; decreased pain    Today's Treatment: 60 minutes   Pt verbally consents to today's tx. Signed consent on file.     Intervention 6/5/2023   TherAct.  Education:  anatomy/physiology of pelvic floor, toileting  mechanics/complete emptying, urinary/bowel/perineal hygiene; dietary irritants, and behavior modifications visceral mobilization of > x sigmoid colon    HEP- review and mod X     Anatomy and physiology of symptoms  X               to improve  functional performance  of -ing tasks at home, exercise, work for 15 minutes     Neuro   Education:  rib breathing, isometric abdominal exercises, pelvic floor muscle contractions/bearing down/kegel, and postural awareness/proprioception   Fascial release to abdominopelvic > X abdominal wall @ bilateral lower quadrants, midline upper quadrant    Fascial release to abdominopelvic > X hypogastric/inguinal n dist. and genito/femoral/LFC n; umbilical scar    Rib breathing with verbal/tactile cueing  X at mid/low ribs with abdominal distraction                   improve msk recruitment, motor control, sequencing, timing, proprioception  sensory re-education for optimal tissue response of tissues to stimuli during actvities of daily living for  30 minutes     TherEx.  Education:  strength, endurance, ROM, and flexibility Kegels Endurance Holds     Kegels: Quick flicks       to restore/maintain strength, endurance, and range of motion for  - minutes    Manual   Education:  Soft tissue Mobilization  Soft tissue Mobilization  X umbilicus, all directions;     Soft tissue Mobilization      to increase pain free  soft tissue and joint range of motion  for  15 minutes   Total Charges  1 TA, 2 NE, 1 MN   Home Exercises Provided: Patient instructed to cont prior HEP.  Exercises were reviewed and Patient was able to demonstrate them prior to the end of the session.       Education provided:   - anatomy/physiology of pelvic floor, posture/body mechanices and behavior modifications  Discussed progression of plan of care with patient; educated pt in activity modification; reviewed HEP with pt. Pt demonstrated and verbalized understanding of all instruction and was provided with a handout of HEP  (see Patient Instructions).    Home Exercises Provided: Patient instructed to cont prior HEP.  Exercises were reviewed and Nguyen was able to demonstrate them prior to the end of the session.  Nguyen demonstrated good  understanding of the education provided.   See EMR under Patient Instructions for exercises provided  prior visit .    Assessment     Nguyen has noticed increased lower abdominal wall apainat scar inferior to umbilicus that is worse around menstural cyce. Notices that area seems to have more tension at umblicis, but does feel better overall; also conites to notice increaed range of motion of sacrum/pelvis, low back and increased strenght of core and glutes. But abdominal pain is bothersome. Positive resposne to today's visit with increased fascial mobility at umbilicus with decreased sensation of burning as well as decreased tension at left upper quadrant with increased boryborgmus. To continue abdominal wall lymph and scar masage at home.  Nguyen demonstrated and verbalized  good  understanding of instruction and education provided for abdominal/hypogastric fascial release and understanding of all instruction and was not provided with a handout of HEP.  See EMR under Patient Instructions for exercises provided prior visit.  Pt prognosis is Fair.   Pt will continue to benefit from skilled outpatient physical therapy to address the deficits listed in the problem list box on initial evaluation, provide pt/family education and to maximize pt's level of independence in the home, gym, and worl/community environment.   Pt's spiritual, cultural and educational needs considered and pt agreeable to plan of care and goals.     Anticipated barriers to physical therapy: scheduling availabilty    Plan   Next Visit: 7/5/2023 -  left lateral hip and glute; sacral plexus + rotational work with single limb;    Plan of Care Re-Certification: 4/17/2023 to 7/17/2023.    Outpatient Physical Therapy 1 time(s) every 2-4  week(s) as needed through 7/17/2023 to include the following interventions: Gait Training, Manual Therapy, Neuromuscular Re-ed, Patient Education, Therapeutic Activities, Therapeutic Exercise, and dry needling.     Gissell Garza, PT ,DPT      I CERTIFY THE NEED FOR THESE SERVICES FURNISHED UNDER THIS PLAN OF TREATMENT AND WHILE UNDER MY CARE   Physician's comments:     Physician's Signature: ___________________________________________________

## 2023-06-06 NOTE — PLAN OF CARE
Pelvic Health Physical Therapy   Progress and Daily Note     Name: Nguyen Dimas; [id number]  Visit Date: 6/5/2023; 12/5of 20  Auth. Exp.: 12/29/2023 Progress Note: 7/5/2023   POC Exp.: 7/17/2023 1 Cx/ - NS   Eval Date: 3/17/2023 Precautions: Standard   FOTO#: -/- (-)    Physician: Sasha Wood, *  Physician Orders: PT Eval and Treat  Therapy Diagnosis:   Encounter Diagnoses   Name Primary?    Pelvic pain Yes    Thoracic spine pain     Muscle spasm     Myalgia     Lower abdominal pain      Precautions: Standard    Time In/Out: 0830/0930  Total Billable Time: 60 minutes    Subjective   Long Term Goals: 16 weeks   met   Pt will be able to sit at least 75' or during a movie without reproduction of sx.  met   Pt will be able to report 50% increase in productivity of coughing.  75%  Pt to report being able to complete a full day at work without significant increase in pain to demonstrate a return towards prior level of function.  50%  Pt to report no pain with palpation of abdominal wall due to improvement in soft tissue mobility from moderate to minimal restrictions.    25%  Pt to report feeling confident in ability to assume appropriate mechanics during work 80% of the time.   25%  Pt to be able to shower and dress without reproduction of sx at least 85% of time.    Pt reports: reports increased discomfort and tender to palpation at midline inferior to umbilicus, and feels harder and more bloated/swollen at suprapubic region; feels like something is in the way when bending forward and is usually associated with hormonal fluctuations   She was compliant with home exercise program, fascial massage  Functional change/Response to previous treatment: improved awareness, motor control; decreased pain    Today's Treatment: 60 minutes   Pt verbally consents to today's tx. Signed consent on file.     Intervention 6/5/2023   TherAct.  Education:  anatomy/physiology of pelvic floor, toileting mechanics/complete  emptying, urinary/bowel/perineal hygiene; dietary irritants, and behavior modifications visceral mobilization of > x sigmoid colon    HEP- review and mod X     Anatomy and physiology of symptoms  X               to improve  functional performance  of -ing tasks at home, exercise, work for 15 minutes     Neuro   Education:  rib breathing, isometric abdominal exercises, pelvic floor muscle contractions/bearing down/kegel, and postural awareness/proprioception   Fascial release to abdominopelvic > X abdominal wall @ bilateral lower quadrants, midline upper quadrant    Fascial release to abdominopelvic > X hypogastric/inguinal n dist. and genito/femoral/LFC n; umbilical scar    Rib breathing with verbal/tactile cueing  X at mid/low ribs with abdominal distraction                   improve msk recruitment, motor control, sequencing, timing, proprioception  sensory re-education for optimal tissue response of tissues to stimuli during actvities of daily living for  30 minutes     TherEx.  Education:  strength, endurance, ROM, and flexibility Kegels Endurance Holds     Kegels: Quick flicks       to restore/maintain strength, endurance, and range of motion for  - minutes    Manual   Education:  Soft tissue Mobilization  Soft tissue Mobilization  X umbilicus, all directions;     Soft tissue Mobilization      to increase pain free  soft tissue and joint range of motion  for  15 minutes   Total Charges  1 TA, 2 NE, 1 TE   Home Exercises Provided: Patient instructed to cont prior HEP.  Exercises were reviewed and Patient was able to demonstrate them prior to the end of the session.       Education provided:   - anatomy/physiology of pelvic floor, posture/body mechanices and behavior modifications  Discussed progression of plan of care with patient; educated pt in activity modification; reviewed HEP with pt. Pt demonstrated and verbalized understanding of all instruction and was provided with a handout of HEP (see Patient  Instructions).    Home Exercises Provided: Patient instructed to cont prior HEP.  Exercises were reviewed and Nguyen was able to demonstrate them prior to the end of the session.  Nguyen demonstrated good  understanding of the education provided.   See EMR under Patient Instructions for exercises provided prior visit.    Assessment     Nguyen has noticed increased lower abdominal wall apainat scar inferior to umbilicus that is worse around menstural cyce. Notices that area seems to have more tension at umblicis, but does feel better overall; also conites to notice increaed range of motion of sacrum/pelvis, low back and increased strenght of core and glutes. But abdominal pain is bothersome. Positive resposne to today's visit with increased fascial mobility at umbilicus with decreased sensation of burning as well as decreased tension at left upper quadrant with increased boryborgmus. To continue abdominal wall lymph and scar masage at home.  Nguyen demonstrated and verbalized  good  understanding of instruction and education provided for abdominal/hypogastric fascial release and understanding of all instruction and was not provided with a handout of HEP.  See EMR under Patient Instructions for exercises provided prior visit.  Pt prognosis is Fair.   Pt will continue to benefit from skilled outpatient physical therapy to address the deficits listed in the problem list box on initial evaluation, provide pt/family education and to maximize pt's level of independence in the home, gym, and worl/community environment.   Pt's spiritual, cultural and educational needs considered and pt agreeable to plan of care and goals.     Anticipated barriers to physical therapy: scheduling availabilty    Plan   Next Visit: 7/5/2023 -  left lateral hip and glute; sacral plexus + rotational work with single limb;    Plan of Care Re-Certification: 4/17/2023 to 7/17/2023.    Outpatient Physical Therapy 1 time(s) every 2-4 week(s) as needed  through 7/17/2023 to include the following interventions: Gait Training, Manual Therapy, Neuromuscular Re-ed, Patient Education, Therapeutic Activities, Therapeutic Exercise, and dry needling.     Gissell Garza, PT ,DPT      I CERTIFY THE NEED FOR THESE SERVICES FURNISHED UNDER THIS PLAN OF TREATMENT AND WHILE UNDER MY CARE   Physician's comments:     Physician's Signature: ___________________________________________________

## 2023-09-27 ENCOUNTER — TELEPHONE (OUTPATIENT)
Dept: OBSTETRICS AND GYNECOLOGY | Facility: CLINIC | Age: 48
End: 2023-09-27

## 2023-10-06 ENCOUNTER — PATIENT MESSAGE (OUTPATIENT)
Dept: OBSTETRICS AND GYNECOLOGY | Facility: CLINIC | Age: 48
End: 2023-10-06
Payer: COMMERCIAL

## 2023-11-10 ENCOUNTER — TELEPHONE (OUTPATIENT)
Dept: OBSTETRICS AND GYNECOLOGY | Facility: CLINIC | Age: 48
End: 2023-11-10

## 2023-12-05 ENCOUNTER — TELEPHONE (OUTPATIENT)
Dept: OBSTETRICS AND GYNECOLOGY | Facility: CLINIC | Age: 48
End: 2023-12-05

## 2024-06-07 ENCOUNTER — PATIENT MESSAGE (OUTPATIENT)
Dept: OBSTETRICS AND GYNECOLOGY | Facility: CLINIC | Age: 49
End: 2024-06-07
Payer: COMMERCIAL

## 2024-08-06 ENCOUNTER — OFFICE VISIT (OUTPATIENT)
Dept: OBSTETRICS AND GYNECOLOGY | Facility: CLINIC | Age: 49
End: 2024-08-06
Payer: COMMERCIAL

## 2024-08-06 VITALS
WEIGHT: 127.19 LBS | SYSTOLIC BLOOD PRESSURE: 132 MMHG | BODY MASS INDEX: 24.04 KG/M2 | DIASTOLIC BLOOD PRESSURE: 81 MMHG

## 2024-08-06 DIAGNOSIS — Z01.419 ENCOUNTER FOR WELL WOMAN EXAM WITH ROUTINE GYNECOLOGICAL EXAM: Primary | ICD-10-CM

## 2024-08-06 DIAGNOSIS — N76.0 ACUTE VAGINITIS: ICD-10-CM

## 2024-08-06 PROCEDURE — 99999 PR PBB SHADOW E&M-EST. PATIENT-LVL II: CPT | Mod: PBBFAC,,,

## 2024-08-06 PROCEDURE — 3008F BODY MASS INDEX DOCD: CPT | Mod: CPTII,S$GLB,,

## 2024-08-06 PROCEDURE — 99396 PREV VISIT EST AGE 40-64: CPT | Mod: S$GLB,,,

## 2024-08-06 PROCEDURE — 1159F MED LIST DOCD IN RCRD: CPT | Mod: CPTII,S$GLB,,

## 2024-08-06 PROCEDURE — 3075F SYST BP GE 130 - 139MM HG: CPT | Mod: CPTII,S$GLB,,

## 2024-08-06 PROCEDURE — 3079F DIAST BP 80-89 MM HG: CPT | Mod: CPTII,S$GLB,,

## 2024-08-06 RX ORDER — FLUCONAZOLE 150 MG/1
150 TABLET ORAL ONCE
Qty: 2 TABLET | Refills: 0 | Status: SHIPPED | OUTPATIENT
Start: 2024-08-06 | End: 2024-08-06

## 2024-08-06 RX ORDER — METRONIDAZOLE 500 MG/1
500 TABLET ORAL 2 TIMES DAILY
Qty: 14 TABLET | Refills: 0 | Status: SHIPPED | OUTPATIENT
Start: 2024-08-06 | End: 2024-08-13

## 2024-08-06 NOTE — PROGRESS NOTES
Chief Complaint: Well Woman Exam     HPI:      Ngueyn Dimas is a 48 y.o.  who presents today for well woman exam.  Patient has had a supracervical hysterectomy.  Reports a history of endometriosis with endometriomas and sigmoid implant.  Today is c/o vaginal odor.  Tried using boric acid suppository but that didn't help.  Some vaginal discharge present.  No other issues, problems, or complaints. Specifically, patient denies pelvic pain, urinary problems, or changes in appetite. Ms. Dimas is currently sexually active with a single male partner.  She declines STD screening today.    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 Dimas confirms that she wears her seatbelt when riding in the car and does not text while driving.     OB History          3    Para   3    Term   3            AB        Living   4         SAB        IAB        Ectopic        Multiple   1    Live Births   4                 ROS:     GENERAL: Denies unintentional weight gain or weight loss. Feeling well overall.   SKIN: Denies rash or lesions.   HEENT: Denies headaches, or vision changes.   CARDIOVASCULAR: Denies palpitations or chest pain.   RESPIRATORY: Denies shortness of breath or dyspnea on exertion.  BREASTS: Denies lumps or nipple discharge.   ABDOMEN: Denies constipation, diarrhea, nausea, vomiting, change in appetite.  URINARY: Denies frequency, dysuria.  NEUROLOGIC: Denies syncope or weakness.   PSYCHIATRIC: Denies uncontrolled depression or anxiety.    Physical Exam:      PHYSICAL  EXAM:  /81 (BP Location: Left arm, Patient Position: Sitting, BP Method: Medium (Automatic))   Wt 57.7 kg (127 lb 3.3 oz)   LMP  (LMP Unknown)   BMI 24.04 kg/m²   Body mass index is 24.04 kg/m².     APPEARANCE: Well nourished, well developed, in no acute distress.  PSYCH: Appropriate mood and affect.  SKIN: No acne or hirsutism  NECK: Neck symmetric without masses  NODES: No inguinal, axillary, or supraclavicular lymph node enlargement  ABDOMEN: Soft.  No tenderness or masses.    CARDIOVASCULAR: No edema of peripheral extremities  BREASTS: Symmetrical, no visible skin lesions. No palpable masses. No nipple discharge bilaterally.  PELVIC: Normal external genitalia without lesions.  Normal hair distribution.  Adequate perineal body, normal urethral meatus.  Vagina moist and well rugated. Without lesions. Vagina with thin white discharge and slight odor.  Cervix pink, without lesions, discharge or tenderness.  No significant cystocele or rectocele.  Bimanual exam shows no midline or adnexal masses.    Gyn note:      A female chaperone was present for the breast and pelvic exam.     Assessment/Plan:     Encounter for well woman exam with routine gynecological exam  -     Counseled patient regarding healthy diet and regular exercise, daily multivitamin, daily seat belt use.   -     BP normotensive  -     She denies abuse and feels safe at home.  -     Pap smear:  UTD  -     MMG:  UTD   -     Colon Cancer Screening: UTD      Acute vaginitis  -     metroNIDAZOLE (FLAGYL) 500 MG tablet; Take 1 tablet (500 mg total) by mouth 2 (two) times daily. Do not drink alcohol while taking this medication. for 7 days  Dispense: 14 tablet; Refill: 0  -     fluconazole (DIFLUCAN) 150 MG Tab; Take 1 tablet (150 mg total) by mouth once. Take 1 tablet (150 mg total) by mouth once daily.  If symptoms persist, take second tablet 3 days after first tablet. for 1 dose  Dispense: 2 tablet; Refill: 0    Follow up in about 1 year for  annual exam or sooner PRN.    Counseling:     Patient was counseled today on current ASCCP pap guidelines, the recommendation for yearly physical exams, healthy diet and exercise routines, safe driving habits, and breast self awareness and annual mammograms. She is to see her PCP for other health maintenance.     Use of the [employer] Patient Portal discussed and encouraged during today's visit.   Counseling time: 15 minutes    Arelis Fuller (Maggie), WILLOW  Obstetrics and Gynecology  [facility]